# Patient Record
Sex: FEMALE | Race: WHITE | NOT HISPANIC OR LATINO | URBAN - METROPOLITAN AREA
[De-identification: names, ages, dates, MRNs, and addresses within clinical notes are randomized per-mention and may not be internally consistent; named-entity substitution may affect disease eponyms.]

---

## 2017-06-17 ENCOUNTER — INPATIENT (INPATIENT)
Facility: HOSPITAL | Age: 82
LOS: 3 days | Discharge: NURSING, REHAB OR LONG-TERM CARE | DRG: 871 | End: 2017-06-21
Attending: INTERNAL MEDICINE | Admitting: HOSPITALIST
Payer: MEDICARE

## 2017-06-17 VITALS
DIASTOLIC BLOOD PRESSURE: 70 MMHG | WEIGHT: 139.99 LBS | RESPIRATION RATE: 16 BRPM | HEIGHT: 64 IN | SYSTOLIC BLOOD PRESSURE: 142 MMHG | HEART RATE: 76 BPM

## 2017-06-17 DIAGNOSIS — N39.0 URINARY TRACT INFECTION, SITE NOT SPECIFIED: ICD-10-CM

## 2017-06-17 DIAGNOSIS — E11.9 TYPE 2 DIABETES MELLITUS WITHOUT COMPLICATIONS: ICD-10-CM

## 2017-06-17 DIAGNOSIS — E78.00 PURE HYPERCHOLESTEROLEMIA, UNSPECIFIED: ICD-10-CM

## 2017-06-17 DIAGNOSIS — A41.9 SEPSIS, UNSPECIFIED ORGANISM: ICD-10-CM

## 2017-06-17 DIAGNOSIS — R32 UNSPECIFIED URINARY INCONTINENCE: ICD-10-CM

## 2017-06-17 DIAGNOSIS — Z66 DO NOT RESUSCITATE: ICD-10-CM

## 2017-06-17 DIAGNOSIS — Z29.9 ENCOUNTER FOR PROPHYLACTIC MEASURES, UNSPECIFIED: ICD-10-CM

## 2017-06-17 DIAGNOSIS — Z86.73 PERSONAL HISTORY OF TRANSIENT ISCHEMIC ATTACK (TIA), AND CEREBRAL INFARCTION WITHOUT RESIDUAL DEFICITS: ICD-10-CM

## 2017-06-17 DIAGNOSIS — R41.82 ALTERED MENTAL STATUS, UNSPECIFIED: ICD-10-CM

## 2017-06-17 DIAGNOSIS — Z90.710 ACQUIRED ABSENCE OF BOTH CERVIX AND UTERUS: Chronic | ICD-10-CM

## 2017-06-17 LAB
ACETONE SERPL-MCNC: ABNORMAL
ALBUMIN SERPL ELPH-MCNC: 4.2 G/DL — SIGNIFICANT CHANGE UP (ref 3.3–5.2)
ALP SERPL-CCNC: 115 U/L — SIGNIFICANT CHANGE UP (ref 40–120)
ALT FLD-CCNC: 18 U/L — SIGNIFICANT CHANGE UP
ANION GAP SERPL CALC-SCNC: 20 MMOL/L — HIGH (ref 5–17)
APTT BLD: 25.8 SEC — LOW (ref 27.5–37.4)
AST SERPL-CCNC: 28 U/L — SIGNIFICANT CHANGE UP
BASOPHILS # BLD AUTO: 0 K/UL — SIGNIFICANT CHANGE UP (ref 0–0.2)
BASOPHILS NFR BLD AUTO: 0.3 % — SIGNIFICANT CHANGE UP (ref 0–2)
BILIRUB SERPL-MCNC: 0.7 MG/DL — SIGNIFICANT CHANGE UP (ref 0.4–2)
BLD GP AB SCN SERPL QL: SIGNIFICANT CHANGE UP
BUN SERPL-MCNC: 25 MG/DL — HIGH (ref 8–20)
CALCIUM SERPL-MCNC: 9.5 MG/DL — SIGNIFICANT CHANGE UP (ref 8.6–10.2)
CHLORIDE SERPL-SCNC: 96 MMOL/L — LOW (ref 98–107)
CO2 SERPL-SCNC: 20 MMOL/L — LOW (ref 22–29)
CREAT SERPL-MCNC: 1.19 MG/DL — SIGNIFICANT CHANGE UP (ref 0.5–1.3)
EOSINOPHIL # BLD AUTO: 0 K/UL — SIGNIFICANT CHANGE UP (ref 0–0.5)
EOSINOPHIL NFR BLD AUTO: 0.1 % — SIGNIFICANT CHANGE UP (ref 0–6)
GLUCOSE SERPL-MCNC: 268 MG/DL — HIGH (ref 70–115)
HCT VFR BLD CALC: 38.8 % — SIGNIFICANT CHANGE UP (ref 37–47)
HGB BLD-MCNC: 13 G/DL — SIGNIFICANT CHANGE UP (ref 12–16)
INR BLD: 1.11 RATIO — SIGNIFICANT CHANGE UP (ref 0.88–1.16)
LACTATE BLDV-MCNC: 1.2 MMOL/L — SIGNIFICANT CHANGE UP (ref 0.5–2)
LYMPHOCYTES # BLD AUTO: 1.6 K/UL — SIGNIFICANT CHANGE UP (ref 1–4.8)
LYMPHOCYTES # BLD AUTO: 10.4 % — LOW (ref 20–55)
MCHC RBC-ENTMCNC: 28.8 PG — SIGNIFICANT CHANGE UP (ref 27–31)
MCHC RBC-ENTMCNC: 33.5 G/DL — SIGNIFICANT CHANGE UP (ref 32–36)
MCV RBC AUTO: 86 FL — SIGNIFICANT CHANGE UP (ref 81–99)
MONOCYTES # BLD AUTO: 1.2 K/UL — HIGH (ref 0–0.8)
MONOCYTES NFR BLD AUTO: 7.7 % — SIGNIFICANT CHANGE UP (ref 3–10)
NEUTROPHILS # BLD AUTO: 12.7 K/UL — HIGH (ref 1.8–8)
NEUTROPHILS NFR BLD AUTO: 81.2 % — HIGH (ref 37–73)
PLATELET # BLD AUTO: 221 K/UL — SIGNIFICANT CHANGE UP (ref 150–400)
POTASSIUM SERPL-MCNC: 4.7 MMOL/L — SIGNIFICANT CHANGE UP (ref 3.5–5.3)
POTASSIUM SERPL-SCNC: 4.7 MMOL/L — SIGNIFICANT CHANGE UP (ref 3.5–5.3)
PROT SERPL-MCNC: 7.5 G/DL — SIGNIFICANT CHANGE UP (ref 6.6–8.7)
PROTHROM AB SERPL-ACNC: 12.2 SEC — SIGNIFICANT CHANGE UP (ref 9.8–12.7)
RBC # BLD: 4.51 M/UL — SIGNIFICANT CHANGE UP (ref 4.4–5.2)
RBC # FLD: 13.4 % — SIGNIFICANT CHANGE UP (ref 11–15.6)
SODIUM SERPL-SCNC: 136 MMOL/L — SIGNIFICANT CHANGE UP (ref 135–145)
TROPONIN T SERPL-MCNC: <0.01 NG/ML — SIGNIFICANT CHANGE UP (ref 0–0.06)
TYPE + AB SCN PNL BLD: SIGNIFICANT CHANGE UP
WBC # BLD: 15.6 K/UL — HIGH (ref 4.8–10.8)
WBC # FLD AUTO: 15.6 K/UL — HIGH (ref 4.8–10.8)

## 2017-06-17 PROCEDURE — 99291 CRITICAL CARE FIRST HOUR: CPT

## 2017-06-17 PROCEDURE — 70450 CT HEAD/BRAIN W/O DYE: CPT | Mod: 26

## 2017-06-17 PROCEDURE — 71010: CPT | Mod: 26

## 2017-06-17 PROCEDURE — 99221 1ST HOSP IP/OBS SF/LOW 40: CPT

## 2017-06-17 PROCEDURE — 93010 ELECTROCARDIOGRAM REPORT: CPT

## 2017-06-17 PROCEDURE — 99223 1ST HOSP IP/OBS HIGH 75: CPT | Mod: GC

## 2017-06-17 RX ORDER — PIPERACILLIN AND TAZOBACTAM 4; .5 G/20ML; G/20ML
3.38 INJECTION, POWDER, LYOPHILIZED, FOR SOLUTION INTRAVENOUS ONCE
Qty: 0 | Refills: 0 | Status: COMPLETED | OUTPATIENT
Start: 2017-06-17 | End: 2017-06-17

## 2017-06-17 RX ORDER — CEFTRIAXONE 500 MG/1
1 INJECTION, POWDER, FOR SOLUTION INTRAMUSCULAR; INTRAVENOUS EVERY 24 HOURS
Qty: 0 | Refills: 0 | Status: DISCONTINUED | OUTPATIENT
Start: 2017-06-18 | End: 2017-06-21

## 2017-06-17 RX ORDER — DEXTROSE 50 % IN WATER 50 %
1 SYRINGE (ML) INTRAVENOUS ONCE
Qty: 0 | Refills: 0 | Status: DISCONTINUED | OUTPATIENT
Start: 2017-06-17 | End: 2017-06-21

## 2017-06-17 RX ORDER — ENOXAPARIN SODIUM 100 MG/ML
40 INJECTION SUBCUTANEOUS EVERY 24 HOURS
Qty: 0 | Refills: 0 | Status: DISCONTINUED | OUTPATIENT
Start: 2017-06-17 | End: 2017-06-21

## 2017-06-17 RX ORDER — ATORVASTATIN CALCIUM 80 MG/1
20 TABLET, FILM COATED ORAL AT BEDTIME
Qty: 0 | Refills: 0 | Status: DISCONTINUED | OUTPATIENT
Start: 2017-06-17 | End: 2017-06-21

## 2017-06-17 RX ORDER — SODIUM CHLORIDE 9 MG/ML
1000 INJECTION, SOLUTION INTRAVENOUS
Qty: 0 | Refills: 0 | Status: DISCONTINUED | OUTPATIENT
Start: 2017-06-17 | End: 2017-06-21

## 2017-06-17 RX ORDER — GLUCAGON INJECTION, SOLUTION 0.5 MG/.1ML
1 INJECTION, SOLUTION SUBCUTANEOUS ONCE
Qty: 0 | Refills: 0 | Status: DISCONTINUED | OUTPATIENT
Start: 2017-06-17 | End: 2017-06-21

## 2017-06-17 RX ORDER — DEXTROSE 50 % IN WATER 50 %
12.5 SYRINGE (ML) INTRAVENOUS ONCE
Qty: 0 | Refills: 0 | Status: DISCONTINUED | OUTPATIENT
Start: 2017-06-17 | End: 2017-06-21

## 2017-06-17 RX ORDER — SODIUM CHLORIDE 9 MG/ML
2000 INJECTION INTRAMUSCULAR; INTRAVENOUS; SUBCUTANEOUS ONCE
Qty: 0 | Refills: 0 | Status: COMPLETED | OUTPATIENT
Start: 2017-06-17 | End: 2017-06-17

## 2017-06-17 RX ORDER — DEXTROSE 50 % IN WATER 50 %
25 SYRINGE (ML) INTRAVENOUS ONCE
Qty: 0 | Refills: 0 | Status: DISCONTINUED | OUTPATIENT
Start: 2017-06-17 | End: 2017-06-21

## 2017-06-17 RX ORDER — SERTRALINE 25 MG/1
100 TABLET, FILM COATED ORAL DAILY
Qty: 0 | Refills: 0 | Status: DISCONTINUED | OUTPATIENT
Start: 2017-06-17 | End: 2017-06-18

## 2017-06-17 RX ORDER — INSULIN LISPRO 100/ML
VIAL (ML) SUBCUTANEOUS AT BEDTIME
Qty: 0 | Refills: 0 | Status: DISCONTINUED | OUTPATIENT
Start: 2017-06-17 | End: 2017-06-21

## 2017-06-17 RX ORDER — SODIUM CHLORIDE 9 MG/ML
1000 INJECTION, SOLUTION INTRAVENOUS
Qty: 0 | Refills: 0 | Status: DISCONTINUED | OUTPATIENT
Start: 2017-06-17 | End: 2017-06-18

## 2017-06-17 RX ORDER — VANCOMYCIN HCL 1 G
1000 VIAL (EA) INTRAVENOUS ONCE
Qty: 0 | Refills: 0 | Status: COMPLETED | OUTPATIENT
Start: 2017-06-17 | End: 2017-06-17

## 2017-06-17 RX ORDER — ASPIRIN AND DIPYRIDAMOLE 25; 200 MG/1; MG/1
1 CAPSULE, EXTENDED RELEASE ORAL
Qty: 0 | Refills: 0 | Status: DISCONTINUED | OUTPATIENT
Start: 2017-06-17 | End: 2017-06-21

## 2017-06-17 RX ORDER — ACETAMINOPHEN 500 MG
650 TABLET ORAL ONCE
Qty: 0 | Refills: 0 | Status: COMPLETED | OUTPATIENT
Start: 2017-06-17 | End: 2017-06-17

## 2017-06-17 RX ORDER — INSULIN LISPRO 100/ML
VIAL (ML) SUBCUTANEOUS
Qty: 0 | Refills: 0 | Status: DISCONTINUED | OUTPATIENT
Start: 2017-06-17 | End: 2017-06-21

## 2017-06-17 RX ADMIN — CEFTRIAXONE 100 GRAM(S): 500 INJECTION, POWDER, FOR SOLUTION INTRAMUSCULAR; INTRAVENOUS at 23:45

## 2017-06-17 RX ADMIN — PIPERACILLIN AND TAZOBACTAM 200 GRAM(S): 4; .5 INJECTION, POWDER, LYOPHILIZED, FOR SOLUTION INTRAVENOUS at 18:25

## 2017-06-17 RX ADMIN — SODIUM CHLORIDE 100 MILLILITER(S): 9 INJECTION, SOLUTION INTRAVENOUS at 23:45

## 2017-06-17 RX ADMIN — ATORVASTATIN CALCIUM 20 MILLIGRAM(S): 80 TABLET, FILM COATED ORAL at 23:44

## 2017-06-17 RX ADMIN — SODIUM CHLORIDE 2000 MILLILITER(S): 9 INJECTION INTRAMUSCULAR; INTRAVENOUS; SUBCUTANEOUS at 18:08

## 2017-06-17 RX ADMIN — Medication 650 MILLIGRAM(S): at 18:22

## 2017-06-17 RX ADMIN — Medication 250 MILLIGRAM(S): at 18:59

## 2017-06-17 NOTE — H&P ADULT - PROBLEM SELECTOR PLAN 1
Initial Stroke work up in ER, patient has CT scan without any acute hemorrhage or obvious infarct. Changes correlate to previous stroke. Likely secondary to UTI, patient appears to be back at baseline mentation and strength after ABx and IV hydration.   Admit to Dr Yoon  Any Bed  Bedside swallow evaluation passed; Diabetic diet  Activity: ambulate with walker and assistance  S/p Zosyn 1 dose  Change to rocephin 1g IV [id number]  Mild leukocytosis to 15  Lactate 1.2  BUN/Cr show dehydration, s/p 2L NS bolus  LR at 100cc/hr. Will monitor and decrease in AM as needed  Ua and UCx  TSH  RPR  Folate level  B12 level Initial Stroke work up in ER, patient has CT scan without any acute hemorrhage or obvious infarct. Changes correlate to previous stroke. Unlikely stroke, as more likely secondary to acute delerium secondary to acute cystitis. Patient appears to be back at baseline mentation and strength after ABx and IV hydration.   Admit to Dr Yoon  Any Bed  Bedside swallow evaluation passed; Diabetic diet  Activity: ambulate with walker and assistance  S/p Zosyn 1 dose  Change to rocephin 1g IV [id number]  Mild leukocytosis to 15  Lactate 1.2  BUN/Cr show dehydration, s/p 2L NS bolus  LR at 100cc/hr. Will monitor and decrease in AM as needed  Ua and UCx  TSH  RPR  Folate level  B12 level

## 2017-06-17 NOTE — ED ADULT TRIAGE NOTE - CHIEF COMPLAINT QUOTE
BIBA, patient is awake, sent to the Ed for eval of CVA, last known well 4pm, no family present upon arrival

## 2017-06-17 NOTE — ED PROVIDER NOTE - CARE PLAN
Principal Discharge DX:	Sepsis  Secondary Diagnosis:	Diabetes  Secondary Diagnosis:	High cholesterol

## 2017-06-17 NOTE — ED PROVIDER NOTE - CRITICAL CARE PROVIDED
direct patient care (not related to procedure)/additional history taking/consultation with other physicians/documentation/interpretation of diagnostic studies

## 2017-06-17 NOTE — H&P ADULT - NSHPPHYSICALEXAM_GEN_ALL_CORE
Vital Signs Last 24 Hrs  T(C): 37.6, Max: 38.7 (06-17 @ 17:49)  T(F): 99.7, Max: 101.7 (06-17 @ 17:49)  HR: 97 (76 - 105)  BP: 139/61 (139/61 - 162/64)  BP(mean): --  RR: 19 (16 - 19)  SpO2: 97% (96% - 97%) Vital Signs Last 24 Hrs  T(C): 37.6, Max: 38.7 (06-17 @ 17:49)  T(F): 99.7, Max: 101.7 (06-17 @ 17:49)  HR: 97 (76 - 105)  BP: 139/61 (139/61 - 162/64)  RR: 19 (16 - 19)  SpO2: 97% (96% - 97%)    Gen: NAD  HEENT: NCAT, PERRLA, Pupils 2mm bilaterally  Neck: supple, no lymphadenopathy  CVS: RRR, +s1/s2, no murmurs appreciated. Cap Refill <2 sec  Lungs: Good air entry bilaterally, CTAB, no wheeze/rales/rhonchi  Abd: +BS, soft, NT, ND, no guarding, rebound tenderness or rigidity  Ext: No cyanosis, edema or calf tenderness, +DP/PT pulses bilaterally  Neuro: Awake, Alert and Oriented to person and place however not to time. CNII-XII grossly intact, motor strength 5/5 in upper right extremity and 3/5 in left upper extremity and 5/5 in lower right extremity and 2/5 in lower left extremity, sensation intact in bilateral upper and lower extremities. Unable to assess Gait. Vital Signs Last 24 Hrs  T(C): 37.6, Max: 38.7 (06-17 @ 17:49)  T(F): 99.7, Max: 101.7 (06-17 @ 17:49)  HR: 97 (76 - 105)  BP: 139/61 (139/61 - 162/64)  RR: 19 (16 - 19)  SpO2: 97% (96% - 97%)    Gen: NAD  HEENT: NCAT, PERRLA, Pupils 2mm bilaterally  Neck: supple, no lymphadenopathy  CVS: RRR, +s1/s2, no murmurs appreciated. Cap Refill <2 sec  Lungs: Good air entry bilaterally, CTAB, no wheeze/rales/rhonchi  Abd: +BS, soft, NT, ND, no guarding, rebound tenderness or rigidity  Ext: No cyanosis, edema or calf tenderness, +DP/PT pulses bilaterally  Neuro: Awake, Alert and Oriented to person and place however not to time. CNII-XII grossly intact, motor strength 5/5 in upper right extremity and 3/5 in left upper extremity and 5/5 in lower right extremity and 2/5 in lower left extremity, sensation intact in bilateral upper and lower extremities. Unable to assess Gait.  Skin: No ulcers, rashes or skin breaks.

## 2017-06-17 NOTE — H&P ADULT - HISTORY OF PRESENT ILLNESS
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 Patient was woken up by her son today at 3:30pm, she appeared to be groggy which was not unusual for her, he left to prepare breakfast for her and upon his return she was increasingly weak and lethargic. She had no shaking or seizure like activity, was not confused or combative. Denies any fever or chills, however as per son he has noticed increased urinary frequency. Denies any cough, aspiration, sick contacts or change in any medication.   Patient lives at home with family, ambulates with walker despite left sided residual weakness, able to perform most ADLs excluding cooking.

## 2017-06-17 NOTE — ED PROVIDER NOTE - OBJECTIVE STATEMENT
The patient is a 82 year old female with history of 4 prior CVAs and DM woke up at 3:30 PM today with AMS, generalized weakness and slurred speech found by her son.  Ian Mchugh Called. No TPA candidate as she woke up these symptoms.    In ER, the patient is febrile and tachycardic, IVF and ABX and Pan-culture ordered

## 2017-06-17 NOTE — ED ADULT NURSE REASSESSMENT NOTE - NS ED NURSE REASSESS COMMENT FT1
patient in no distress VSS, temp down to 99.6, alert awake answering questions appropriatly, respirations even and unlabored remains on CM

## 2017-06-17 NOTE — H&P ADULT - PROBLEM SELECTOR PLAN 3
CT head without any acute findings  Will not require stroke monitoring or MRI  Aggrenox 25/200mg BID  Lipitor 20mg PO qhs

## 2017-06-17 NOTE — H&P ADULT - PROBLEM SELECTOR PLAN 7
IMPROVE score risk 1.7%  HASBLED score 1.1%  VTE prophylaxis: Lovenox 40mg SQ qdaily Discussed with son and confirmed that patient is DNR/DNI  Son is health care proxy  Patient has living will stating DNR/DNI status  Will complete MOLST form

## 2017-06-17 NOTE — ED ADULT NURSE NOTE - OBJECTIVE STATEMENT
pt alert and awake, + AMS, as per son, pt went to bed, woke up at 1530 with stroke like symptoms and progressively worse around 1600, as per son, unknown exact time of symptoms, as per son, pt acting right, pt has hx of stroke with left sided deficits, son states unable to move left side, NIH of 18, pt also presents with hematoma to left side of forehead, with ecchymotic areas to left knee and old healed wound on right thigh, LS diminished at bases, will continue to monitor.

## 2017-06-17 NOTE — ED PROVIDER NOTE - ENMT, MLM
Airway patent, Nasal mucosa clear. Mouth with dry oral mucosa. Throat has no vesicles, no oropharyngeal exudates and uvula is midline.

## 2017-06-17 NOTE — H&P ADULT - PROBLEM SELECTOR PLAN 6
Patient currently being treated for UTI,   Will hold off on Oxybutynin during acute phase of treatment

## 2017-06-17 NOTE — H&P ADULT - NSHPLABSRESULTS_GEN_ALL_CORE
13.0   15.6  )-----------( 221      ( 17 Jun 2017 17:19 )             38.8       17 Jun 2017 17:19    136    |  96     |  25.0   ----------------------------<  268    4.7     |  20.0   |  1.19     Ca    9.5        17 Jun 2017 17:19    TPro  7.5    /  Alb  4.2    /  TBili  0.7    /  DBili  x      /  AST  28     /  ALT  18     /  AlkPhos  115    17 Jun 2017 17:19    INR - 1.11  Acetone - trace  Lactate 1.2    Radiology:  EXAM:  CT BRAIN STROKE PROTOCOL                        PROCEDURE DATE:  06/17/2017    INTERPRETATION:  Head CT without contrast   COMPARISON: None.  CLINICAL INFORMATION:  CVA symptoms. Acute CVA. Stroke protocol..  TECHNIQUE: Contiguous axial 2.5 mm slice thickness images of the head   were obtained without the use of intravenous contrast media.  FINDINGS:  Moderate cerebral volume loss is present with secondary proportional   prominence of the sulci and ventricles.  The posterior fossa structures and basal cisterns appear normal.  There is no intracranial hemorrhage.   There is no intracranial mass or midline shift.  There is no sulcal effacement to suggest acute stroke.  There are scattered white matter hypodensities consistent with small vessel disease.  Bilateral basal ganglia chronic lacunar infarcts noted.  The visualized portions of the optic globes and paranasal sinuses are normal.  There are no skull fractures.  IMPRESSION:  No acute intracranial findings.  Moderate atrophy and chronic white matter microvascular ischemic changes   as described.   If acute stroke is of clinical concern, MRI with diffusion-weighted   images would be helpful for further characterization.

## 2017-06-17 NOTE — H&P ADULT - ASSESSMENT
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 Concerning for UTI.

## 2017-06-17 NOTE — H&P ADULT - PROBLEM SELECTOR PLAN 2
s/p Zosyn IV x 1 dose  CW Rocephin 1g IV q24h  UCx and Ua Acute cystitis as mentioned above  s/p Zosyn IV x 1 dose  CW Rocephin 1g IV q24h  UCx and Ua  CT abdomen to rule out pyelonephritis

## 2017-06-17 NOTE — H&P ADULT - NSHPSOCIALHISTORY_GEN_ALL_CORE
Lives at home with Family  Ambulates with walker  Able to perform most ADLs except cooking  Previous smoker smoked 2-3 packs per day for 30 years quit 25 years ago  No ETOH or drugs.

## 2017-06-17 NOTE — ED PROVIDER NOTE - MEDICAL DECISION MAKING DETAILS
The patient woke up with generalized weakness and slurred speech. In ED febrile and tachycardic.  Will admit for further evaluation

## 2017-06-17 NOTE — H&P ADULT - NSHPREVIEWOFSYSTEMS_GEN_ALL_CORE
General:	NAD, no confusion  Skin/Breast: +SCC on left side of forehead that was recently removed	  Ophthalmologic: No changes in vision	  ENMT: No difficulty swallowing, swelling in throat  Respiratory and Thorax: No wheezing, cough or SOB	  Cardiovascular: No chest pain, SOB, palpitations or pedal edema  Gastrointestinal: No constipation/diarrhea  Genitourinary: +increased urinary frequency, no dysuria  Musculoskeletal: +left sided residual weakness in upper ext and lower extremity.  Neurological: Focal deficits in left upper and lower ext, mentation intact  Psychiatric: No sadness, agustín or hysteria  Hematology/Lymphatics: No bleeding or swelling  Endocrine: No heat/cold intolerance

## 2017-06-17 NOTE — H&P ADULT - PROBLEM SELECTOR PLAN 4
Accuchecks  Diabetic consistent carb diet  Humalog sliding scale  Observe FS and adjust insulin requirement as needed

## 2017-06-18 DIAGNOSIS — G93.40 ENCEPHALOPATHY, UNSPECIFIED: ICD-10-CM

## 2017-06-18 LAB
ACETONE SERPL-MCNC: ABNORMAL
ANION GAP SERPL CALC-SCNC: 18 MMOL/L — HIGH (ref 5–17)
ANION GAP SERPL CALC-SCNC: 19 MMOL/L — HIGH (ref 5–17)
APPEARANCE UR: ABNORMAL
BACTERIA # UR AUTO: ABNORMAL
BASOPHILS # BLD AUTO: 0 K/UL — SIGNIFICANT CHANGE UP (ref 0–0.2)
BASOPHILS NFR BLD AUTO: 0.2 % — SIGNIFICANT CHANGE UP (ref 0–2)
BILIRUB UR-MCNC: NEGATIVE — SIGNIFICANT CHANGE UP
BUN SERPL-MCNC: 18 MG/DL — SIGNIFICANT CHANGE UP (ref 8–20)
BUN SERPL-MCNC: 18 MG/DL — SIGNIFICANT CHANGE UP (ref 8–20)
CALCIUM SERPL-MCNC: 8.9 MG/DL — SIGNIFICANT CHANGE UP (ref 8.6–10.2)
CALCIUM SERPL-MCNC: 9 MG/DL — SIGNIFICANT CHANGE UP (ref 8.6–10.2)
CHLORIDE SERPL-SCNC: 97 MMOL/L — LOW (ref 98–107)
CHLORIDE SERPL-SCNC: 98 MMOL/L — SIGNIFICANT CHANGE UP (ref 98–107)
CO2 SERPL-SCNC: 19 MMOL/L — LOW (ref 22–29)
CO2 SERPL-SCNC: 21 MMOL/L — LOW (ref 22–29)
COLOR SPEC: YELLOW — SIGNIFICANT CHANGE UP
COMMENT - URINE: SIGNIFICANT CHANGE UP
CREAT SERPL-MCNC: 0.98 MG/DL — SIGNIFICANT CHANGE UP (ref 0.5–1.3)
CREAT SERPL-MCNC: 1.1 MG/DL — SIGNIFICANT CHANGE UP (ref 0.5–1.3)
DIFF PNL FLD: ABNORMAL
EOSINOPHIL # BLD AUTO: 0 K/UL — SIGNIFICANT CHANGE UP (ref 0–0.5)
EOSINOPHIL NFR BLD AUTO: 0.1 % — SIGNIFICANT CHANGE UP (ref 0–6)
EPI CELLS # UR: SIGNIFICANT CHANGE UP
FOLATE SERPL-MCNC: >20 NG/ML — HIGH (ref 4–16)
GLUCOSE SERPL-MCNC: 105 MG/DL — SIGNIFICANT CHANGE UP (ref 70–115)
GLUCOSE SERPL-MCNC: 204 MG/DL — HIGH (ref 70–115)
GLUCOSE UR QL: 50 MG/DL
HBA1C BLD-MCNC: 7 % — HIGH (ref 4–5.6)
HCT VFR BLD CALC: 32.7 % — LOW (ref 37–47)
HGB BLD-MCNC: 10.8 G/DL — LOW (ref 12–16)
KETONES UR-MCNC: ABNORMAL
LEUKOCYTE ESTERASE UR-ACNC: ABNORMAL
LYMPHOCYTES # BLD AUTO: 1.6 K/UL — SIGNIFICANT CHANGE UP (ref 1–4.8)
LYMPHOCYTES # BLD AUTO: 11.1 % — LOW (ref 20–55)
MAGNESIUM SERPL-MCNC: 1.2 MG/DL — LOW (ref 1.6–2.6)
MAGNESIUM SERPL-MCNC: 2.4 MG/DL — SIGNIFICANT CHANGE UP (ref 1.6–2.6)
MCHC RBC-ENTMCNC: 28.5 PG — SIGNIFICANT CHANGE UP (ref 27–31)
MCHC RBC-ENTMCNC: 33 G/DL — SIGNIFICANT CHANGE UP (ref 32–36)
MCV RBC AUTO: 86.3 FL — SIGNIFICANT CHANGE UP (ref 81–99)
MONOCYTES # BLD AUTO: 1.5 K/UL — HIGH (ref 0–0.8)
MONOCYTES NFR BLD AUTO: 9.8 % — SIGNIFICANT CHANGE UP (ref 3–10)
NEUTROPHILS # BLD AUTO: 11.7 K/UL — HIGH (ref 1.8–8)
NEUTROPHILS NFR BLD AUTO: 78.5 % — HIGH (ref 37–73)
NITRITE UR-MCNC: NEGATIVE — SIGNIFICANT CHANGE UP
PH UR: 5 — SIGNIFICANT CHANGE UP (ref 5–8)
PHOSPHATE SERPL-MCNC: 2.2 MG/DL — LOW (ref 2.4–4.7)
PHOSPHATE SERPL-MCNC: 2.3 MG/DL — LOW (ref 2.4–4.7)
PLATELET # BLD AUTO: 197 K/UL — SIGNIFICANT CHANGE UP (ref 150–400)
POTASSIUM SERPL-MCNC: 3.8 MMOL/L — SIGNIFICANT CHANGE UP (ref 3.5–5.3)
POTASSIUM SERPL-MCNC: 4 MMOL/L — SIGNIFICANT CHANGE UP (ref 3.5–5.3)
POTASSIUM SERPL-SCNC: 3.8 MMOL/L — SIGNIFICANT CHANGE UP (ref 3.5–5.3)
POTASSIUM SERPL-SCNC: 4 MMOL/L — SIGNIFICANT CHANGE UP (ref 3.5–5.3)
PROT UR-MCNC: 15 MG/DL
RBC # BLD: 3.79 M/UL — LOW (ref 4.4–5.2)
RBC # FLD: 13.4 % — SIGNIFICANT CHANGE UP (ref 11–15.6)
RBC CASTS # UR COMP ASSIST: ABNORMAL /HPF (ref 0–4)
RPR SERPL-ACNC: SIGNIFICANT CHANGE UP
SODIUM SERPL-SCNC: 136 MMOL/L — SIGNIFICANT CHANGE UP (ref 135–145)
SODIUM SERPL-SCNC: 136 MMOL/L — SIGNIFICANT CHANGE UP (ref 135–145)
SP GR SPEC: 1 — LOW (ref 1.01–1.02)
T3 SERPL-MCNC: 55 NG/DL — LOW (ref 80–200)
T4 AB SER-ACNC: 6.4 UG/DL — SIGNIFICANT CHANGE UP (ref 4.5–12)
TSH SERPL-MCNC: 0.76 UIU/ML — SIGNIFICANT CHANGE UP (ref 0.27–4.2)
UROBILINOGEN FLD QL: NEGATIVE MG/DL — SIGNIFICANT CHANGE UP
VIT B12 SERPL-MCNC: 499 PG/ML — SIGNIFICANT CHANGE UP (ref 180–914)
WBC # BLD: 14.9 K/UL — HIGH (ref 4.8–10.8)
WBC # FLD AUTO: 14.9 K/UL — HIGH (ref 4.8–10.8)
WBC UR QL: ABNORMAL

## 2017-06-18 PROCEDURE — 93880 EXTRACRANIAL BILAT STUDY: CPT | Mod: 26

## 2017-06-18 PROCEDURE — 70450 CT HEAD/BRAIN W/O DYE: CPT | Mod: 26

## 2017-06-18 PROCEDURE — 74176 CT ABD & PELVIS W/O CONTRAST: CPT | Mod: 26

## 2017-06-18 PROCEDURE — 12345: CPT | Mod: GC,NC

## 2017-06-18 RX ORDER — OXYBUTYNIN CHLORIDE 5 MG
5 TABLET ORAL DAILY
Qty: 0 | Refills: 0 | Status: DISCONTINUED | OUTPATIENT
Start: 2017-06-19 | End: 2017-06-21

## 2017-06-18 RX ORDER — MAGNESIUM SULFATE 500 MG/ML
2 VIAL (ML) INJECTION
Qty: 0 | Refills: 0 | Status: DISCONTINUED | OUTPATIENT
Start: 2017-06-18 | End: 2017-06-18

## 2017-06-18 RX ORDER — SODIUM CHLORIDE 9 MG/ML
1000 INJECTION, SOLUTION INTRAVENOUS
Qty: 0 | Refills: 0 | Status: DISCONTINUED | OUTPATIENT
Start: 2017-06-18 | End: 2017-06-19

## 2017-06-18 RX ORDER — MAGNESIUM OXIDE 400 MG ORAL TABLET 241.3 MG
400 TABLET ORAL
Qty: 0 | Refills: 0 | Status: DISCONTINUED | OUTPATIENT
Start: 2017-06-18 | End: 2017-06-18

## 2017-06-18 RX ORDER — ACETAMINOPHEN 500 MG
650 TABLET ORAL EVERY 6 HOURS
Qty: 0 | Refills: 0 | Status: DISCONTINUED | OUTPATIENT
Start: 2017-06-18 | End: 2017-06-21

## 2017-06-18 RX ORDER — SODIUM,POTASSIUM PHOSPHATES 278-250MG
1 POWDER IN PACKET (EA) ORAL
Qty: 0 | Refills: 0 | Status: COMPLETED | OUTPATIENT
Start: 2017-06-18 | End: 2017-06-19

## 2017-06-18 RX ORDER — THIAMINE MONONITRATE (VIT B1) 100 MG
100 TABLET ORAL DAILY
Qty: 0 | Refills: 0 | Status: DISCONTINUED | OUTPATIENT
Start: 2017-06-18 | End: 2017-06-21

## 2017-06-18 RX ORDER — ASCORBIC ACID 60 MG
500 TABLET,CHEWABLE ORAL DAILY
Qty: 0 | Refills: 0 | Status: DISCONTINUED | OUTPATIENT
Start: 2017-06-18 | End: 2017-06-21

## 2017-06-18 RX ORDER — INSULIN GLARGINE 100 [IU]/ML
5 INJECTION, SOLUTION SUBCUTANEOUS AT BEDTIME
Qty: 0 | Refills: 0 | Status: DISCONTINUED | OUTPATIENT
Start: 2017-06-18 | End: 2017-06-21

## 2017-06-18 RX ORDER — MAGNESIUM OXIDE 400 MG ORAL TABLET 241.3 MG
400 TABLET ORAL DAILY
Qty: 0 | Refills: 0 | Status: DISCONTINUED | OUTPATIENT
Start: 2017-06-18 | End: 2017-06-21

## 2017-06-18 RX ORDER — SERTRALINE 25 MG/1
100 TABLET, FILM COATED ORAL DAILY
Qty: 0 | Refills: 0 | Status: DISCONTINUED | OUTPATIENT
Start: 2017-06-18 | End: 2017-06-21

## 2017-06-18 RX ADMIN — Medication 1: at 11:57

## 2017-06-18 RX ADMIN — Medication 1: at 22:19

## 2017-06-18 RX ADMIN — SODIUM CHLORIDE 75 MILLILITER(S): 9 INJECTION, SOLUTION INTRAVENOUS at 22:20

## 2017-06-18 RX ADMIN — Medication: at 17:36

## 2017-06-18 RX ADMIN — Medication 1 TABLET(S): at 22:18

## 2017-06-18 RX ADMIN — Medication 650 MILLIGRAM(S): at 09:17

## 2017-06-18 RX ADMIN — SERTRALINE 100 MILLIGRAM(S): 25 TABLET, FILM COATED ORAL at 18:23

## 2017-06-18 RX ADMIN — Medication: at 08:28

## 2017-06-18 RX ADMIN — Medication 50 GRAM(S): at 15:35

## 2017-06-18 RX ADMIN — ASPIRIN AND DIPYRIDAMOLE 1 CAPSULE(S): 25; 200 CAPSULE, EXTENDED RELEASE ORAL at 18:35

## 2017-06-18 RX ADMIN — Medication 50 GRAM(S): at 14:15

## 2017-06-18 RX ADMIN — ASPIRIN AND DIPYRIDAMOLE 1 CAPSULE(S): 25; 200 CAPSULE, EXTENDED RELEASE ORAL at 11:47

## 2017-06-18 RX ADMIN — ENOXAPARIN SODIUM 40 MILLIGRAM(S): 100 INJECTION SUBCUTANEOUS at 22:20

## 2017-06-18 RX ADMIN — Medication 1 TABLET(S): at 18:23

## 2017-06-18 RX ADMIN — CEFTRIAXONE 100 GRAM(S): 500 INJECTION, POWDER, FOR SOLUTION INTRAMUSCULAR; INTRAVENOUS at 23:08

## 2017-06-18 RX ADMIN — ATORVASTATIN CALCIUM 20 MILLIGRAM(S): 80 TABLET, FILM COATED ORAL at 22:18

## 2017-06-18 RX ADMIN — Medication 100 MILLIGRAM(S): at 22:17

## 2017-06-18 RX ADMIN — INSULIN GLARGINE 5 UNIT(S): 100 INJECTION, SOLUTION SUBCUTANEOUS at 22:18

## 2017-06-18 NOTE — CONSULT NOTE ADULT - ASSESSMENT
1) Long standing RIGHT staghorn calculus  2) Right pyelonephritis with sepsis    Recommendations :    No urological intervention at this time.    Thank you.
82 year old woman with a history of strokes with altered mental status and anisocoria.

## 2017-06-18 NOTE — PROGRESS NOTE ADULT - PROBLEM SELECTOR PLAN 6
Patient currently being treated for UTI,   Will hold off on Oxybutynin during acute phase of treatment -c/w Oxybutynin xl 5mg po qd

## 2017-06-18 NOTE — CONSULT NOTE ADULT - SUBJECTIVE AND OBJECTIVE BOX
CHIEF COMPLAINT: altered mental status, anisocoria    HPI: 82yFemale admitted yesterday with altered mental status and generalized weakness. She has a history of two strokes (20 and 4 years ago) resulting in left sided weakness. She also has some early dementia. Yesterday her son found her to be less alert than usual. She was also very weak. She was brought to the ED and was found to have a UTI. After receiving IV antibiotics and IV fluids she seemed to return to her baseline. This morning she was found to be confused again. Her pupils were also found to be asymmetric with right pupil larger. Her son is at the bedside and is not sure if anisocoria is chronic. He does report chronic problems in the right eye due to diabetes.     REVIEW OF SYSTEMS: Pertinent symptoms negative other than listed in HPI and PMH.    PAST MEDICAL & SURGICAL HISTORY:  Urinary incontinence  Stroke: 20 years prior and 4 years prior  High cholesterol  Diabetes  H/O: hysterectomy    MEDICATIONS  (STANDING):  enoxaparin Injectable 40milliGRAM(s) SubCutaneous every 24 hours  cefTRIAXone   IVPB 1Gram(s) IV Intermittent every 24 hours  atorvastatin 20milliGRAM(s) Oral at bedtime  dipyridamole 200 mG/aspirin 25 mG 1Capsule(s) Oral two times a day  insulin lispro (HumaLOG) corrective regimen sliding scale  SubCutaneous three times a day before meals  insulin lispro (HumaLOG) corrective regimen sliding scale  SubCutaneous at bedtime  dextrose 5%. 1000milliLiter(s) IV Continuous <Continuous>  dextrose 50% Injectable 12.5Gram(s) IV Push once  dextrose 50% Injectable 25Gram(s) IV Push once  dextrose 50% Injectable 25Gram(s) IV Push once  lactated ringers. 1000milliLiter(s) IV Continuous <Continuous>    MEDICATIONS  (PRN):  dextrose Gel 1Dose(s) Oral once PRN Blood Glucose LESS THAN 70 milliGRAM(s)/deciliter  glucagon  Injectable 1milliGRAM(s) IntraMuscular once PRN Glucose LESS THAN 70 milligrams/deciliter  acetaminophen   Tablet 650milliGRAM(s) Oral every 6 hours PRN For Temp greater than 38 C (100.4 F)    Allergies    No Known Allergies    Intolerances        FAMILY HISTORY:  No pertinent family history in first degree relatives          SOCIAL HISTORY:    Living Situation:  lives with family      VITAL SIGNS:  Vital Signs Last 24 Hrs  T(C): 37.7, Max: 38.8 (06-18 @ 07:42)  T(F): 99.8, Max: 101.8 (06-18 @ 07:42)  HR: 102 (76 - 105)  BP: 139/69 (135/62 - 162/64)  BP(mean): --  RR: 18 (16 - 19)  SpO2: 97% (95% - 98%)    PHYSICAL EXAMINATION:  General: Well-developed, well nourished, in no acute distress.  Eyes: Conjunctiva and sclera clear.  Neurologic:  - Mental Status:  Alert, awake. Minimal verbal output. States that her name is Emilie. She is not oriented to place or time.   Not following commands.  Cranial Nerves II-XII:    II:  Visual fields: unreliable. Right pupil 4 mm and L pupil 2-3 mm. Both reactive   III, IV, VI:  Extraocular movements are intact without nystagmus.  V:  Facial sensation: unreliable  VII:  Face is symmetric with normal eye closure and smile  VIII:  Hearing: unreliable  IX, X:  Uvula is midline and soft palate rises symmetrically  XI:  Head turning and shoulder shrug are intact.  XII:  Patient refuses to stick out tongue  - Motor:  Patient not cooperative with confrontation testing. She does have some chronic left leg weakness.   - Reflexes:  3+ symmetric, plantars flexor bilaterally  - Sensory:  unreliable  - Gait: deferred    LABS:                          10.8   14.9  )-----------( 197      ( 18 Jun 2017 08:30 )             32.7     18 Jun 2017 08:30    136    |  97     |  18.0   ----------------------------<  204    4.0     |  21.0   |  1.10     Ca    8.9        18 Jun 2017 08:30  Phos  2.2       18 Jun 2017 08:30  Mg     1.2       18 Jun 2017 08:30    TPro  7.5    /  Alb  4.2    /  TBili  0.7    /  DBili  x      /  AST  28     /  ALT  18     /  AlkPhos  115    17 Jun 2017 17:19    LIVER FUNCTIONS - ( 17 Jun 2017 17:19 )  Alb: 4.2 g/dL / Pro: 7.5 g/dL / ALK PHOS: 115 U/L / ALT: 18 U/L / AST: 28 U/L / GGT: x           PT/INR - ( 17 Jun 2017 17:19 )   PT: 12.2 sec;   INR: 1.11 ratio         PTT - ( 17 Jun 2017 17:19 )  PTT:25.8 sec      RADIOLOGY & ADDITIONAL STUDIES:    CT brain 6/17/17;     No acute intracranial findings.    Moderate atrophy and chronic white matter microvascular ischemic changes   as described.
Hx from son, Gulshan Mongesins. Patient is a poor historian.    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 Patient was woken up by her son today at 3:30pm, she appeared to be groggy which was not unusual for her, he left to prepare breakfast for her and upon his return she was increasingly weak and lethargic. She had no shaking or seizure like activity, was not confused or combative. Denies any fever or chills, however as per son he has noticed increased urinary frequency. Denies any cough, aspiration, sick contacts or change in any medication.   Patient lives at home with family, ambulates with walker despite left sided residual weakness, able to perform most ADLs excluding cooking. (2017 21:29)  Patient was found to be septic & W/U demonstrated a right staghorn calculus. She denies RT flank & abd pain, dysuria, increased frequency or urgency, fever, chills & gross hematuria.  She has not had frequent UTI's.  Urology consultation was requested for recommendations.    Past  Hx :  According to her son, she has had this staghorn for a long time.  She had ESWL of RT renal stones about 20 yrs ago. No other  procedures.  Urinary incontinence-on oxybutynin from PCP    PAST MEDICAL & SURGICAL HISTORY:  Urinary incontinence  Stroke: 20 years prior and 4 years prior  High cholesterol  Diabetes  H/O: hysterectomy      REVIEW OF SYSTEMS:    Constitutional: No fever, weight loss or fatigue  Eyes: No eye pain, visual disturbances, or discharge  ENMT:  No difficulty hearing, tinnitus, vertigo; No sinus or throat pain  Respiratory: No cough, wheezing, chills or hemoptysis  Cardiovascular: No chest pain, palpitations, shortness of breath, dizziness or leg swelling  Gastrointestinal: No abdominal or epigastric pain. No nausea, vomiting or hematemesis; No diarrhea or constipation. No melena or hematochezia.  Genitourinary: No dysuria, frequency, hematuria or incontinence  Rectal: No pain, hemorrhoids or incontinence  Neurological: No headaches, memory loss, loss of strength, numbness or tremors  Skin: No itching, burning, rashes or lesions   Lymph Nodes: No enlarged glands  Musculoskeletal: No joint pain or swelling; No muscle, back or extremity pain  Psychiatric: No depression, anxiety, mood swings or difficulty sleeping  Heme/Lymph: No easy bruising or bleeding gums  Allergy and Immunologic: No hives or eczema    MEDICATIONS  (STANDING):  enoxaparin Injectable 40milliGRAM(s) SubCutaneous every 24 hours  cefTRIAXone   IVPB 1Gram(s) IV Intermittent every 24 hours  atorvastatin 20milliGRAM(s) Oral at bedtime  dipyridamole 200 mG/aspirin 25 mG 1Capsule(s) Oral two times a day  insulin lispro (HumaLOG) corrective regimen sliding scale  SubCutaneous three times a day before meals  insulin lispro (HumaLOG) corrective regimen sliding scale  SubCutaneous at bedtime  dextrose 5%. 1000milliLiter(s) IV Continuous <Continuous>  dextrose 50% Injectable 12.5Gram(s) IV Push once  dextrose 50% Injectable 25Gram(s) IV Push once  dextrose 50% Injectable 25Gram(s) IV Push once  lactated ringers. 1000milliLiter(s) IV Continuous <Continuous>  magnesium oxide 400milliGRAM(s) Oral three times a day with meals  magnesium sulfate  IVPB 2Gram(s) IV Intermittent every 1 hour  potassium acid phosphate/sodium acid phosphate tablet (K-PHOS No. 2) 1Tablet(s) Oral four times a day with meals  multivitamin 1Tablet(s) Oral daily  sertraline 100milliGRAM(s) Oral daily    MEDICATIONS  (PRN):  dextrose Gel 1Dose(s) Oral once PRN Blood Glucose LESS THAN 70 milliGRAM(s)/deciliter  glucagon  Injectable 1milliGRAM(s) IntraMuscular once PRN Glucose LESS THAN 70 milligrams/deciliter  acetaminophen   Tablet 650milliGRAM(s) Oral every 6 hours PRN For Temp greater than 38 C (100.4 F)      Allergies    No Known Allergies    SOCIAL HISTORY:    Tobacco : quit 30 yrs ago; 1 PPD x 20 yrs  ETOH : none    Children : 1-adopted    FAMILY HISTORY:  No pertinent family history in first degree relatives      Vital Signs Last 24 Hrs  T(C): 37.2, Max: 38.8 (-18 @ 07:42)  T(F): 99, Max: 101.8 (18 @ 07:42)  HR: 90 (76 - 105)  BP: 106/66 (106/66 - 162/64)  BP(mean): --  RR: 17 (16 - 19)  SpO2: 97% (95% - 98%)    PHYSICAL EXAM:    General: Well developed; well nourished; in no acute distress  Head: Normocephalic; atraumatic  Respiratory: No wheezes, rales or rhonchi  Cardiovascular: Regular rate and rhythm. S1 and S2 Normal; No murmurs, gallops or rubs  Gastrointestinal: Soft non-tender non-distended; Normal bowel sounds; No hepatosplenomegaly  Genitourinary: No costovertebral angle tenderness.  Urinary bladder is clinically not distended  Extremities: No clubbing, cyanosis or edema  Vascular: femoral pulses palpable 1+ bilaterally  Neurological: Alert and awake  Skin: Warm and dry. No acute rash  Musculoskeletal: Normal tone, without deformities  Psychiatric: Cooperative and appropriate      LABS:                        10.8   14.9  )-----------( 197      ( 2017 08:30 )             32.7     06-18    136  |  97<L>  |  18.0  ----------------------------<  204<H>  4.0   |  21.0<L>  |  1.10    Ca    8.9      2017 08:30  Phos  2.2     06-18  Mg     1.2     -18    TPro  7.5  /  Alb  4.2  /  TBili  0.7  /  DBili  x   /  AST  28  /  ALT  18  /  AlkPhos  115  06-17    PT/INR - ( 2017 17:19 )   PT: 12.2 sec;   INR: 1.11 ratio         PTT - ( 2017 17:19 )  PTT:25.8 sec  Urinalysis Basic - ( 2017 02:52 )    Color: Yellow / Appearance: Slightly Turbid / S.005 / pH: x  Gluc: x / Ketone: Trace  / Bili: Negative / Urobili: Negative mg/dL   Blood: x / Protein: 15 mg/dL / Nitrite: Negative   Leuk Esterase: Moderate / RBC: 3-5 /HPF / WBC 6-10   Sq Epi: x / Non Sq Epi: Occasional / Bacteria: Occasional            RADIOLOGY & ADDITIONAL STUDIES:     EXAM:  CT ABDOMEN AND PELVIS                          PROCEDURE DATE:  2017        INTERPRETATION:  CT renal stone   (CT of the abdomen and pelvis without   contrast)      CLINICAL INFORMATION:      Acute cystitis. Incontinence. Infection.   Assess for pyelonephritis. TECHNIQUE:  Contiguous axial 5 mm sections   were obtained using single helical acquisition through the abdomen and   pelvis and were reconstructed as axial 5 mm sections.  Higher resolution   axial and coronal images were reconstructed through  the kidneys.   Oral   and intravenous contrast were withheld for this indication.      FINDINGS:   No previous examinations are available for review.    The lung bases are clear.         The liver demonstrates homogeneous attenuation with no focal lesion,   allowing for the noncontrast technique.  Hepatic size and contours are   maintained.  Hepatic and portal veins are not displaced.  No intrahepatic   or common ductal dilatation is recognized.  There is cholelithiasis withthe multiple lung gallstones without   secondary signs of acute cholecystitis.     The pancreas is intact without ductal dilatation or focal lesion.  The   spleen is normal in size.    No adrenal masses are found.    There is a staghorn no right renal calculus occupying renal calyces and   of the renal pelvis. Right upper pole 3.6 cm dilatation which may   represent an obstructed upper pole of calyx versus renal cyst. The right   ureter is normal in course and caliber of.There is focal right midpole   cortical atrophy likely result of scarring from prior infection. Acute   pyelonephritis cannot be excluded. No gross hydronephrosis seen.     Left kidney and collecting system unremarkable.  .. Liu catheter within collapsed bladder.  Status post hysterectomy.    No enlarged lymph nodes are found.  No ascites is present.  The osseous   structures are intact.    The bowel appears unremarkable.  No obstruction, perforation or abscess   is recognized.         The retroperitoneal vessels show atherosclerotic calcified plaques   throughout  nondilated abdominal aorta, mesenteric and iliac arteries.   IMPRESSION:   A right renal staghorn calculus with mid pole of cortical   scarring deformity of. 3.6 cm right upper pole cyst versus obstructed  upper pole collecting system.  Left kidney unremarkable. Liu catheter in place.  Cholelithiasis.  Acute right renal pyelonephritis cannot be excluded. No perinephric   stranding.                            DEBBY BROWN M.D., ATTENDING RADIOLOGIST  This document has been electronically signed. 2017  9:29AM

## 2017-06-18 NOTE — PROGRESS NOTE ADULT - PROBLEM SELECTOR PLAN 3
CT head negative  CW Aggrenox 25/200mg BID  CW Lipitor 20mg PO qhs -Initial CT head negative, repeat ct head negative, will do full work up to evaluate further for possible new stroke   -f/u tte and carotid duplex   -neurology is following the patient   -c/w  Aggrenox 25/200mg BID  -c/w Lipitor 20mg PO qhs

## 2017-06-18 NOTE — PROGRESS NOTE ADULT - PROBLEM SELECTOR PLAN 4
Accuchecks  Diabetic consistent carb diet  Humalog sliding scale  Observe FS and adjust insulin requirement as needed -FS stable  -c/w Accuchecks ACHSTID   -c/w Diabetic consistent carb diet  -c/w Humalog sliding scale

## 2017-06-18 NOTE — CONSULT NOTE ADULT - PROBLEM SELECTOR RECOMMENDATION 9
Altered mental status is most likely toxic encephalopathy related to UTI in a patient with chronic neuro deficits (prior stroke and dementia).  Unclear if anisocoria is new but it is mild and both pupils are reactive.  Repeat NCHCT will be done to r/o any new infarct missed on initial CT scan.  Continue supportive care and treatment of UTI with antibiotics.  Continue aspirin/dipyridamole  for secondary stroke prevention.  Will follow with you.

## 2017-06-18 NOTE — PROGRESS NOTE ADULT - PROBLEM SELECTOR PLAN 1
Initial Stroke work up in ER, patient has CT scan without any acute hemorrhage or obvious infarct. Changes correlate to previous stroke. Unlikely stroke, as more likely secondary to acute delirium secondary to acute cystitis. Patient appears to be back at baseline mentation and strength after ABx and IV hydration.   Rocephin 1g IV [id number] Day #2 of IV ABx  BUN/Cr show dehydration, s/p 2L NS bolus am labs pending  LR at 100cc/hr.  Ua and UCx  TSH  RPR  Folate level  B12 level acute encephalopathy. Altered mental status is most likely toxic encephalopathy secondary to UTI, will still rule out CVA given extensive prior hx. Neurology consulted.   Initial Stroke work up in ER, patient has CT scan without any acute hemorrhage or obvious infarct. Changes correlate to previous stroke.   -c/w empiric tx for UTI   -c/w IVF hydration given prerenal azotemia   -TSH wnl  -f/u RPR  -Folate level wnl  -B12 level wnl

## 2017-06-18 NOTE — PROGRESS NOTE ADULT - SUBJECTIVE AND OBJECTIVE BOX
CC: AMS (17 Jun 2017 21:29)    Patient seen and examined at bedside, No acute overnight events. Patient appears to be confused and anxious this morning however is easily redirected.   Patient not ambulating however she is eating well, +earl.  Cardiac monitor reviewed;    Review of Systems:  General: NAD, no confusion  Skin/Breast: +SCC on left side of forehead that was recently removed	  Ophthalmologic: No changes in vision	  ENMT: No difficulty swallowing, swelling in throat  Respiratory and Thorax: No wheezing, cough or SOB	  Cardiovascular: No chest pain, SOB, palpitations or pedal edema  Gastrointestinal: No constipation/diarrhea  Genitourinary: +increased urinary frequency, no dysuria  Musculoskeletal: +left sided residual weakness in upper ext and lower extremity.  Neurological: Focal deficits in left upper and lower ext, mentation intact  Psychiatric: No sadness, agustín or hysteria  Hematology/Lymphatics: No bleeding or swelling  Endocrine: No heat/cold intolerance    VS:   Vital Signs Last 24 Hrs  T(C): 37.4, Max: 38.7 (06-17 @ 17:49)  T(F): 99.4, Max: 101.7 (06-17 @ 17:49)  HR: 97 (76 - 105)  BP: 143/74 (135/62 - 162/64)  RR: 18 (16 - 19)  SpO2: 97% (95% - 98%)    Physical Exam:   Gen: NAD  HEENT: NCAT, PERRLA, Pupils 2mm bilaterally  CVS: RRR, +s1/s2, no murmurs appreciated.   Lungs: Good air entry bilaterally, CTAB, no wheeze/rales/rhonchi  Abd: +BS, soft, NT, ND  Ext: No cyanosis, edema or calf tenderness, +DP/PT pulses bilaterally  Neuro: Awake, Alert and Oriented to person and place however not to time. CNII-XII grossly intact, motor strength 5/5 in upper right extremity and 3/5 in left upper extremity and 5/5 in lower right extremity and 2/5 in lower left extremity, sensation intact in bilateral upper and lower extremities. Unable to assess Gait.  Skin: No ulcers, rashes or skin breaks.    Labs:                        13.0   15.6  )-----------( 221      ( 17 Jun 2017 17:19 )             38.8   06-17    136  |  96<L>  |  25.0<H>  ----------------------------<  268<H>  4.7   |  20.0<L>  |  1.19    Ca    9.5      17 Jun 2017 17:19    TPro  7.5  /  Alb  4.2  /  TBili  0.7  /  DBili  x   /  AST  28  /  ALT  18  /  AlkPhos  115  06-17      Radiology:  EXAM:  CT BRAIN STROKE PROTOCOL                        PROCEDURE DATE:  06/17/2017    INTERPRETATION:  Head CT without contrast   COMPARISON: None.  CLINICAL INFORMATION:  CVA symptoms. Acute CVA. Stroke protocol..  TECHNIQUE: Contiguous axial 2.5 mm slice thickness images of the head   were obtained without the use of intravenous contrast media.  FINDINGS:  Moderate cerebral volume loss is present with secondary proportional   prominence of the sulci and ventricles.  The posterior fossa structures and basal cisterns appear normal.  There is no intracranial hemorrhage.   There is no intracranial mass or midline shift.  There is no sulcal effacement to suggest acute stroke.  There are scattered white matter hypodensities consistent with small vessel disease.  Bilateral basal ganglia chronic lacunar infarcts noted.  The visualized portions of the optic globes and paranasal sinuses are normal.  There are no skull fractures.  IMPRESSION:  No acute intracranial findings.  Moderate atrophy and chronic white matter microvascular ischemic changes   as described.   If acute stroke is of clinical concern, MRI with diffusion-weighted   images would be helpful for further characterization.    Medications:  MEDICATIONS  (STANDING):  enoxaparin Injectable 40milliGRAM(s) SubCutaneous every 24 hours  cefTRIAXone   IVPB 1Gram(s) IV Intermittent every 24 hours  sertraline 100milliGRAM(s) Oral daily  atorvastatin 20milliGRAM(s) Oral at bedtime  dipyridamole 200 mG/aspirin 25 mG 1Capsule(s) Oral two times a day  insulin lispro (HumaLOG) corrective regimen sliding scale  SubCutaneous three times a day before meals  insulin lispro (HumaLOG) corrective regimen sliding scale  SubCutaneous at bedtime  lactated ringers. 1000milliLiter(s) IV Continuous <Continuous>    MEDICATIONS  (PRN):  acetaminophen   Tablet 650milliGRAM(s) Oral every 6 hours PRN For Temp greater than 38 C (100.4 F) CC: AMS (17 Jun 2017 21:29)    Patient seen and examined at bedside, No acute overnight events. Patient appears to be confused and anxious this morning however is easily redirected.   Patient not ambulating however she is eating well, +earl.      Review of Systems:  General: NAD   Skin/Breast: +SCC on left side of forehead that was recently removed	  Ophthalmologic: No changes in vision	  ENMT: No difficulty swallowing, swelling in throat  Respiratory and Thorax: No wheezing, cough or SOB	  Cardiovascular: No chest pain, SOB, palpitations or pedal edema  Gastrointestinal: No constipation/diarrhea  Genitourinary: +increased urinary frequency, no dysuria  Musculoskeletal: +left sided residual weakness in upper ext and lower extremity.  Neurological: Focal deficits in left upper and lower ext, mentation intact  Psychiatric: No sadness, agustín or hysteria  Hematology/Lymphatics: No bleeding or swelling  Endocrine: No heat/cold intolerance    VS:   Vital Signs Last 24 Hrs  T(C): 37.4, Max: 38.7 (06-17 @ 17:49)  T(F): 99.4, Max: 101.7 (06-17 @ 17:49)  HR: 97 (76 - 105)  BP: 143/74 (135/62 - 162/64)  RR: 18 (16 - 19)  SpO2: 97% (95% - 98%)    Physical Exam:   Gen: NAD  HEENT: NCAT, PERRLA, anisocoria -mild and both pupils are reactive.  CVS: RRR, +s1/s2, no murmurs appreciated.   Lungs: Good air entry bilaterally, CTAB, no wheeze/rales/rhonchi  Abd: +BS, soft, NT, ND  Ext: No cyanosis, edema or calf tenderness, +DP/PT pulses bilaterally  Neuro: Awake, Alert and confused. CNII-XII grossly intact, motor strength 5/5 in upper right extremity and 3/5 in left upper extremity and 5/5 in lower right extremity and 2/5 in lower left extremity, sensation intact in bilateral upper and lower extremities. Unable to assess Gait.  Skin: No ulcers, rashes or skin breaks.    Labs:                        13.0   15.6  )-----------( 221      ( 17 Jun 2017 17:19 )             38.8   06-17    136  |  96<L>  |  25.0<H>  ----------------------------<  268<H>  4.7   |  20.0<L>  |  1.19    Ca    9.5      17 Jun 2017 17:19    TPro  7.5  /  Alb  4.2  /  TBili  0.7  /  DBili  x   /  AST  28  /  ALT  18  /  AlkPhos  115  06-17      Radiology:  EXAM:  CT BRAIN STROKE PROTOCOL                        PROCEDURE DATE:  06/17/2017    INTERPRETATION:  Head CT without contrast   COMPARISON: None.  CLINICAL INFORMATION:  CVA symptoms. Acute CVA. Stroke protocol..  TECHNIQUE: Contiguous axial 2.5 mm slice thickness images of the head   were obtained without the use of intravenous contrast media.  FINDINGS:  Moderate cerebral volume loss is present with secondary proportional   prominence of the sulci and ventricles.  The posterior fossa structures and basal cisterns appear normal.  There is no intracranial hemorrhage.   There is no intracranial mass or midline shift.  There is no sulcal effacement to suggest acute stroke.  There are scattered white matter hypodensities consistent with small vessel disease.  Bilateral basal ganglia chronic lacunar infarcts noted.  The visualized portions of the optic globes and paranasal sinuses are normal.  There are no skull fractures.  IMPRESSION:  No acute intracranial findings.  Moderate atrophy and chronic white matter microvascular ischemic changes   as described.   If acute stroke is of clinical concern, MRI with diffusion-weighted   images would be helpful for further characterization.    Medications:  MEDICATIONS  (STANDING):  enoxaparin Injectable 40milliGRAM(s) SubCutaneous every 24 hours  cefTRIAXone   IVPB 1Gram(s) IV Intermittent every 24 hours  sertraline 100milliGRAM(s) Oral daily  atorvastatin 20milliGRAM(s) Oral at bedtime  dipyridamole 200 mG/aspirin 25 mG 1Capsule(s) Oral two times a day  insulin lispro (HumaLOG) corrective regimen sliding scale  SubCutaneous three times a day before meals  insulin lispro (HumaLOG) corrective regimen sliding scale  SubCutaneous at bedtime  lactated ringers. 1000milliLiter(s) IV Continuous <Continuous>    MEDICATIONS  (PRN):  acetaminophen   Tablet 650milliGRAM(s) Oral every 6 hours PRN For Temp greater than 38 C (100.4 F)

## 2017-06-18 NOTE — PROGRESS NOTE ADULT - ASSESSMENT
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 Concerning for UTI. 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 Altered mental status is most likely toxic encephalopathy secondary to UTI, will still rule out CVA given extensive prior hx. Neurology consulted.

## 2017-06-18 NOTE — PROGRESS NOTE ADULT - PROBLEM SELECTOR PLAN 2
Acute cystitis as mentioned above  s/p Zosyn IV x 1 dose in ER  CW Rocephin 1g IV q24h [place] #2 of IV abx  UCx and Ua  CT abdomen to rule out pyelonephritis AMS can be secondary to Acute cystitis    -s/p Zosyn IV x 1 dose in ER  -c/w Rocephin 1g IV q24h [place] #2 of IV abx  -UCx pending results  -CT abd/pelvis shows a right renal staghorn calculus with mid pole of cortical   scarring deformity of. 3.6 cm right upper pole cyst versus obstructed upper pole collecting system. Acute right renal pyelonephritis cannot be excluded. No perinephric stranding.  -urology consulted

## 2017-06-19 LAB
ANION GAP SERPL CALC-SCNC: 13 MMOL/L — SIGNIFICANT CHANGE UP (ref 5–17)
BASOPHILS # BLD AUTO: 0 K/UL — SIGNIFICANT CHANGE UP (ref 0–0.2)
BASOPHILS NFR BLD AUTO: 0.3 % — SIGNIFICANT CHANGE UP (ref 0–2)
BUN SERPL-MCNC: 15 MG/DL — SIGNIFICANT CHANGE UP (ref 8–20)
CALCIUM SERPL-MCNC: 8.5 MG/DL — LOW (ref 8.6–10.2)
CHLORIDE SERPL-SCNC: 101 MMOL/L — SIGNIFICANT CHANGE UP (ref 98–107)
CO2 SERPL-SCNC: 22 MMOL/L — SIGNIFICANT CHANGE UP (ref 22–29)
CREAT SERPL-MCNC: 1.16 MG/DL — SIGNIFICANT CHANGE UP (ref 0.5–1.3)
CULTURE RESULTS: SIGNIFICANT CHANGE UP
EOSINOPHIL # BLD AUTO: 0 K/UL — SIGNIFICANT CHANGE UP (ref 0–0.5)
EOSINOPHIL NFR BLD AUTO: 0.4 % — SIGNIFICANT CHANGE UP (ref 0–6)
GLUCOSE SERPL-MCNC: 174 MG/DL — HIGH (ref 70–115)
HCT VFR BLD CALC: 30.6 % — LOW (ref 37–47)
HGB BLD-MCNC: 10.1 G/DL — LOW (ref 12–16)
LYMPHOCYTES # BLD AUTO: 2.5 K/UL — SIGNIFICANT CHANGE UP (ref 1–4.8)
LYMPHOCYTES # BLD AUTO: 21.1 % — SIGNIFICANT CHANGE UP (ref 20–55)
MAGNESIUM SERPL-MCNC: 1.8 MG/DL — SIGNIFICANT CHANGE UP (ref 1.6–2.6)
MCHC RBC-ENTMCNC: 28.6 PG — SIGNIFICANT CHANGE UP (ref 27–31)
MCHC RBC-ENTMCNC: 33 G/DL — SIGNIFICANT CHANGE UP (ref 32–36)
MCV RBC AUTO: 86.7 FL — SIGNIFICANT CHANGE UP (ref 81–99)
MONOCYTES # BLD AUTO: 1.4 K/UL — HIGH (ref 0–0.8)
MONOCYTES NFR BLD AUTO: 12 % — HIGH (ref 3–10)
NEUTROPHILS # BLD AUTO: 7.7 K/UL — SIGNIFICANT CHANGE UP (ref 1.8–8)
NEUTROPHILS NFR BLD AUTO: 65.9 % — SIGNIFICANT CHANGE UP (ref 37–73)
PHOSPHATE SERPL-MCNC: 2.9 MG/DL — SIGNIFICANT CHANGE UP (ref 2.4–4.7)
PLATELET # BLD AUTO: 188 K/UL — SIGNIFICANT CHANGE UP (ref 150–400)
POTASSIUM SERPL-MCNC: 3.4 MMOL/L — LOW (ref 3.5–5.3)
POTASSIUM SERPL-SCNC: 3.4 MMOL/L — LOW (ref 3.5–5.3)
PREALB SERPL-MCNC: 11 MG/DL — LOW (ref 18–38)
RBC # BLD: 3.53 M/UL — LOW (ref 4.4–5.2)
RBC # FLD: 13.5 % — SIGNIFICANT CHANGE UP (ref 11–15.6)
SODIUM SERPL-SCNC: 136 MMOL/L — SIGNIFICANT CHANGE UP (ref 135–145)
SPECIMEN SOURCE: SIGNIFICANT CHANGE UP
WBC # BLD: 11.7 K/UL — HIGH (ref 4.8–10.8)
WBC # FLD AUTO: 11.7 K/UL — HIGH (ref 4.8–10.8)

## 2017-06-19 PROCEDURE — 99233 SBSQ HOSP IP/OBS HIGH 50: CPT | Mod: GC

## 2017-06-19 RX ORDER — POTASSIUM CHLORIDE 20 MEQ
40 PACKET (EA) ORAL ONCE
Qty: 0 | Refills: 0 | Status: COMPLETED | OUTPATIENT
Start: 2017-06-19 | End: 2017-06-19

## 2017-06-19 RX ADMIN — ASPIRIN AND DIPYRIDAMOLE 1 CAPSULE(S): 25; 200 CAPSULE, EXTENDED RELEASE ORAL at 05:24

## 2017-06-19 RX ADMIN — Medication 100 MILLIGRAM(S): at 12:31

## 2017-06-19 RX ADMIN — INSULIN GLARGINE 5 UNIT(S): 100 INJECTION, SOLUTION SUBCUTANEOUS at 22:44

## 2017-06-19 RX ADMIN — Medication 40 MILLIEQUIVALENT(S): at 12:31

## 2017-06-19 RX ADMIN — Medication 500 MILLIGRAM(S): at 12:31

## 2017-06-19 RX ADMIN — Medication 1 TABLET(S): at 12:31

## 2017-06-19 RX ADMIN — SERTRALINE 100 MILLIGRAM(S): 25 TABLET, FILM COATED ORAL at 12:31

## 2017-06-19 RX ADMIN — MAGNESIUM OXIDE 400 MG ORAL TABLET 400 MILLIGRAM(S): 241.3 TABLET ORAL at 12:31

## 2017-06-19 RX ADMIN — ATORVASTATIN CALCIUM 20 MILLIGRAM(S): 80 TABLET, FILM COATED ORAL at 22:44

## 2017-06-19 RX ADMIN — Medication 1 TABLET(S): at 08:15

## 2017-06-19 RX ADMIN — CEFTRIAXONE 100 GRAM(S): 500 INJECTION, POWDER, FOR SOLUTION INTRAMUSCULAR; INTRAVENOUS at 22:44

## 2017-06-19 RX ADMIN — Medication 5 MILLIGRAM(S): at 12:31

## 2017-06-19 RX ADMIN — ASPIRIN AND DIPYRIDAMOLE 1 CAPSULE(S): 25; 200 CAPSULE, EXTENDED RELEASE ORAL at 17:07

## 2017-06-19 RX ADMIN — ENOXAPARIN SODIUM 40 MILLIGRAM(S): 100 INJECTION SUBCUTANEOUS at 22:44

## 2017-06-19 RX ADMIN — Medication 5: at 12:31

## 2017-06-19 RX ADMIN — Medication 2: at 17:07

## 2017-06-19 NOTE — PROGRESS NOTE ADULT - PROBLEM SELECTOR PLAN 2
acute encephalopathy. Resolved.  obvious infarct. Changes correlate to previous stroke.   -c/w empiric tx for UTI acute encephalopathy. Resolved.  -c/w empiric tx for UTI

## 2017-06-19 NOTE — PROGRESS NOTE ADULT - PROBLEM SELECTOR PLAN 1
Pyelonephritis with staghorn calculi  Pt clinically improved.  -c/w Rocephin 1g IV antibiotics day #3  -f/u UCx pending results  -CT abd/pelvis shows a right renal staghorn calculus with mid pole of cortical   scarring deformity of. 3.6 cm right upper pole cyst versus obstructed upper pole collecting system.   -urology consulted and recommends no surgical intervention at this time but to treat for pyelonephritis. Pyelonephritis with staghorn calculi  Pt clinically improved. Afebrile and leukocytosis trending down.   -c/w Rocephin 1g IV antibiotics day #3  -Ucx shows <[id number] GNR and Bcx pending results   -CT abd/pelvis shows a right renal staghorn calculus with mid pole of cortical   scarring deformity of. 3.6 cm right upper pole cyst versus obstructed upper pole collecting system.   -urology consulted and recommends no surgical intervention at this time but to treat for pyelonephritis.

## 2017-06-19 NOTE — PROGRESS NOTE ADULT - SUBJECTIVE AND OBJECTIVE BOX
CC: AMS (2017 21:29)    Interval/Overnight event  Patient seen and examined at bedside with no acute overnight events. Pt seems pleasant and able to answer question. As per nurse, pt's son came by yesterday and pt was verbal and talking. Pt denies having any dysuria, abdominal pain, vomiting or fever. Son brought in burger which pt was able to eat. As per nurse, pt had no BM overnight, is voiding on pad. Liu has since been removed.       Review of Systems: Neg except as above.    VS:   Vital Signs Last 24 Hrs  T(C): 37.3, Max: 38.8 (- @ 07:42)  T(F): 99.2, Max: 101.8 ( @ 07:42)  HR: 98 (86 - 102)  BP: 127/59 (106/66 - 143/59)  RR: 18 (17 - 18)  SpO2: 98% (97% - 98%)    Physical Exam:   Gen: NAD  HEENT: NCAT, PERRLA, anisocoria -mild and both pupils are reactive.  CVS: RRR, +s1/s2, no murmurs appreciated.   Lungs: Good air entry bilaterally, CTAB, no wheeze/rales/rhonchi  Abd: +BS, soft, NT, ND  Ext: No cyanosis, edema or calf tenderness, +DP/PT pulses bilaterally  Neuro: Awake, Alert and confused. CNII-XII grossly intact, motor strength 5/5 in upper right extremity and 3/5 in left upper extremity and 5/5 in lower right extremity and 2/5 in lower left extremity, sensation intact in bilateral upper and lower extremities. Unable to assess Gait.  Skin: No ulcers, rashes or skin breaks.    Labs:                                   10.8   14.9  )-----------( 197      ( 2017 08:30 )             32.7   06-18    136  |  98  |  18.0  ----------------------------<  105  3.8   |  19.0<L>  |  0.98    Ca    9.0      2017 16:09  Phos  2.3     06-18  Mg     2.4     06-18    TPro  7.5  /  Alb  4.2  /  TBili  0.7  /  DBili  x   /  AST  28  /  ALT  18  /  AlkPhos  115  06-17   PT/INR - ( 2017 17:19 )   PT: 12.2 sec;   INR: 1.11 ratio         PTT - ( 2017 17:19 )  PTT:25.8 sec  Urinalysis Basic - ( 2017 02:52 )    Color: Yellow / Appearance: Slightly Turbid / S.005 / pH: x  Gluc: x / Ketone: Trace  / Bili: Negative / Urobili: Negative mg/dL   Blood: x / Protein: 15 mg/dL / Nitrite: Negative   Leuk Esterase: Moderate / RBC: 3-5 /HPF / WBC 6-10   Sq Epi: x / Non Sq Epi: Occasional / Bacteria: Occasional        Radiology:  No new imaging.    Medications:  MEDICATIONS  (STANDING):  enoxaparin Injectable 40milliGRAM(s) SubCutaneous every 24 hours  cefTRIAXone   IVPB 1Gram(s) IV Intermittent every 24 hours  atorvastatin 20milliGRAM(s) Oral at bedtime  dipyridamole 200 mG/aspirin 25 mG 1Capsule(s) Oral two times a day  insulin lispro (HumaLOG) corrective regimen sliding scale  SubCutaneous three times a day before meals  insulin lispro (HumaLOG) corrective regimen sliding scale  SubCutaneous at bedtime  dextrose 5%. 1000milliLiter(s) IV Continuous <Continuous>  dextrose 50% Injectable 12.5Gram(s) IV Push once  dextrose 50% Injectable 25Gram(s) IV Push once  dextrose 50% Injectable 25Gram(s) IV Push once  oxybutynin XL 5milliGRAM(s) Oral daily  lactated ringers. 1000milliLiter(s) IV Continuous <Continuous>  potassium acid phosphate/sodium acid phosphate tablet (K-PHOS No. 2) 1Tablet(s) Oral four times a day with meals  multivitamin 1Tablet(s) Oral daily  sertraline 100milliGRAM(s) Oral daily  insulin glargine Injectable (LANTUS) 5Unit(s) SubCutaneous at bedtime  ascorbic acid 500milliGRAM(s) Oral daily  thiamine 100milliGRAM(s) Oral daily  magnesium oxide 400milliGRAM(s) Oral daily    MEDICATIONS  (PRN):  dextrose Gel 1Dose(s) Oral once PRN Blood Glucose LESS THAN 70 milliGRAM(s)/deciliter  glucagon  Injectable 1milliGRAM(s) IntraMuscular once PRN Glucose LESS THAN 70 milligrams/deciliter  acetaminophen   Tablet 650milliGRAM(s) Oral every 6 hours PRN For Temp greater than 38 C (100.4 F)

## 2017-06-19 NOTE — PROGRESS NOTE ADULT - PROBLEM SELECTOR PLAN 3
-Initial CT head negative, repeat ct head negative  -f/u tte and carotid duplex to r/o possiblity of new stroke  -neurology is following the patient   -c/w  Aggrenox 25/200mg BID  -c/w Lipitor 20mg PO qhs -Initial CT head negative, repeat ct head negative  -carotid duplex does not show significant stenosis   -f/u tte   -neurology is following the patient   -c/w  Aggrenox 25/200mg BID  -c/w Lipitor 20mg PO qhs

## 2017-06-19 NOTE — PROGRESS NOTE ADULT - ASSESSMENT
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 Altered mental status is most likely toxic encephalopathy secondary to UTI. CVA was possibility given extensive prior hx however, CT head negative and pt symptom improved. Neurology consulted and recommended no further intervention. Urology consulted after CT abdomen done showed staghorn calculi and recommends no surgical intervention at this time but to treat for pyelonephritis. 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 Altered mental status is most likely toxic encephalopathy secondary to UTI. CVA was possibility given extensive prior hx however, CT head negative and pt symptom improved. Neurology consulted and recommended no further intervention. Encephalopathy resolved and patient is at her baseline.  Urology consulted after CT abdomen done showed staghorn calculi and recommends no surgical intervention at this time but to treat for pyelonephritis. 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 Patient had sepsis secondary to UTI on admission which has now resolved. Altered mental status is most likely toxic encephalopathy secondary to UTI. CVA was possibility given extensive prior hx however, CT head negative and pt symptom improved. Neurology consulted and recommended no further intervention. Encephalopathy resolved and patient is at her baseline.  Urology consulted after CT abdomen done showed staghorn calculi and recommends no surgical intervention at this time but to treat for pyelonephritis.

## 2017-06-20 LAB
ANION GAP SERPL CALC-SCNC: 16 MMOL/L — SIGNIFICANT CHANGE UP (ref 5–17)
BASOPHILS # BLD AUTO: 0 K/UL — SIGNIFICANT CHANGE UP (ref 0–0.2)
BASOPHILS NFR BLD AUTO: 0.3 % — SIGNIFICANT CHANGE UP (ref 0–2)
BUN SERPL-MCNC: 21 MG/DL — HIGH (ref 8–20)
C DIFF BY PCR RESULT: SIGNIFICANT CHANGE UP
C DIFF TOX GENS STL QL NAA+PROBE: SIGNIFICANT CHANGE UP
CALCIUM SERPL-MCNC: 8.8 MG/DL — SIGNIFICANT CHANGE UP (ref 8.6–10.2)
CHLORIDE SERPL-SCNC: 103 MMOL/L — SIGNIFICANT CHANGE UP (ref 98–107)
CO2 SERPL-SCNC: 21 MMOL/L — LOW (ref 22–29)
CREAT SERPL-MCNC: 1.11 MG/DL — SIGNIFICANT CHANGE UP (ref 0.5–1.3)
EOSINOPHIL # BLD AUTO: 0.1 K/UL — SIGNIFICANT CHANGE UP (ref 0–0.5)
EOSINOPHIL NFR BLD AUTO: 1.6 % — SIGNIFICANT CHANGE UP (ref 0–6)
GLUCOSE SERPL-MCNC: 247 MG/DL — HIGH (ref 70–115)
HCT VFR BLD CALC: 31.8 % — LOW (ref 37–47)
HGB BLD-MCNC: 10.3 G/DL — LOW (ref 12–16)
LYMPHOCYTES # BLD AUTO: 2.7 K/UL — SIGNIFICANT CHANGE UP (ref 1–4.8)
LYMPHOCYTES # BLD AUTO: 31 % — SIGNIFICANT CHANGE UP (ref 20–55)
MCHC RBC-ENTMCNC: 28.3 PG — SIGNIFICANT CHANGE UP (ref 27–31)
MCHC RBC-ENTMCNC: 32.4 G/DL — SIGNIFICANT CHANGE UP (ref 32–36)
MCV RBC AUTO: 87.4 FL — SIGNIFICANT CHANGE UP (ref 81–99)
MONOCYTES # BLD AUTO: 0.8 K/UL — SIGNIFICANT CHANGE UP (ref 0–0.8)
MONOCYTES NFR BLD AUTO: 9.1 % — SIGNIFICANT CHANGE UP (ref 3–10)
NEUTROPHILS # BLD AUTO: 5.1 K/UL — SIGNIFICANT CHANGE UP (ref 1.8–8)
NEUTROPHILS NFR BLD AUTO: 57.9 % — SIGNIFICANT CHANGE UP (ref 37–73)
PLATELET # BLD AUTO: 214 K/UL — SIGNIFICANT CHANGE UP (ref 150–400)
POTASSIUM SERPL-MCNC: 4.2 MMOL/L — SIGNIFICANT CHANGE UP (ref 3.5–5.3)
POTASSIUM SERPL-SCNC: 4.2 MMOL/L — SIGNIFICANT CHANGE UP (ref 3.5–5.3)
RBC # BLD: 3.64 M/UL — LOW (ref 4.4–5.2)
RBC # FLD: 13.5 % — SIGNIFICANT CHANGE UP (ref 11–15.6)
SODIUM SERPL-SCNC: 140 MMOL/L — SIGNIFICANT CHANGE UP (ref 135–145)
WBC # BLD: 8.8 K/UL — SIGNIFICANT CHANGE UP (ref 4.8–10.8)
WBC # FLD AUTO: 8.8 K/UL — SIGNIFICANT CHANGE UP (ref 4.8–10.8)

## 2017-06-20 PROCEDURE — 99233 SBSQ HOSP IP/OBS HIGH 50: CPT | Mod: GC

## 2017-06-20 RX ORDER — OXYBUTYNIN CHLORIDE 5 MG
1 TABLET ORAL
Qty: 0 | Refills: 0 | COMMUNITY
Start: 2017-06-20

## 2017-06-20 RX ORDER — THIAMINE MONONITRATE (VIT B1) 100 MG
1 TABLET ORAL
Qty: 0 | Refills: 0 | COMMUNITY
Start: 2017-06-20

## 2017-06-20 RX ORDER — ASCORBIC ACID 60 MG
1 TABLET,CHEWABLE ORAL
Qty: 0 | Refills: 0 | COMMUNITY
Start: 2017-06-20

## 2017-06-20 RX ORDER — ACETAMINOPHEN 500 MG
2 TABLET ORAL
Qty: 240 | Refills: 0 | OUTPATIENT
Start: 2017-06-20 | End: 2017-07-20

## 2017-06-20 RX ORDER — CEPHALEXIN 500 MG
1 CAPSULE ORAL
Qty: 22 | Refills: 0 | OUTPATIENT
Start: 2017-06-20 | End: 2017-07-01

## 2017-06-20 RX ORDER — SERTRALINE 25 MG/1
1 TABLET, FILM COATED ORAL
Qty: 0 | Refills: 0 | COMMUNITY
Start: 2017-06-20

## 2017-06-20 RX ORDER — ATORVASTATIN CALCIUM 80 MG/1
1 TABLET, FILM COATED ORAL
Qty: 0 | Refills: 0 | COMMUNITY
Start: 2017-06-20

## 2017-06-20 RX ORDER — ASPIRIN AND DIPYRIDAMOLE 25; 200 MG/1; MG/1
1 CAPSULE, EXTENDED RELEASE ORAL
Qty: 0 | Refills: 0 | COMMUNITY
Start: 2017-06-20

## 2017-06-20 RX ADMIN — Medication 1 TABLET(S): at 13:48

## 2017-06-20 RX ADMIN — Medication 1: at 08:19

## 2017-06-20 RX ADMIN — ASPIRIN AND DIPYRIDAMOLE 1 CAPSULE(S): 25; 200 CAPSULE, EXTENDED RELEASE ORAL at 17:25

## 2017-06-20 RX ADMIN — Medication: at 12:46

## 2017-06-20 RX ADMIN — MAGNESIUM OXIDE 400 MG ORAL TABLET 400 MILLIGRAM(S): 241.3 TABLET ORAL at 13:48

## 2017-06-20 RX ADMIN — Medication 5 MILLIGRAM(S): at 13:48

## 2017-06-20 RX ADMIN — Medication 100 MILLIGRAM(S): at 13:48

## 2017-06-20 RX ADMIN — INSULIN GLARGINE 5 UNIT(S): 100 INJECTION, SOLUTION SUBCUTANEOUS at 22:25

## 2017-06-20 RX ADMIN — SERTRALINE 100 MILLIGRAM(S): 25 TABLET, FILM COATED ORAL at 13:48

## 2017-06-20 RX ADMIN — ASPIRIN AND DIPYRIDAMOLE 1 CAPSULE(S): 25; 200 CAPSULE, EXTENDED RELEASE ORAL at 05:27

## 2017-06-20 RX ADMIN — ENOXAPARIN SODIUM 40 MILLIGRAM(S): 100 INJECTION SUBCUTANEOUS at 22:25

## 2017-06-20 RX ADMIN — ATORVASTATIN CALCIUM 20 MILLIGRAM(S): 80 TABLET, FILM COATED ORAL at 22:25

## 2017-06-20 RX ADMIN — Medication 500 MILLIGRAM(S): at 13:48

## 2017-06-20 RX ADMIN — CEFTRIAXONE 100 GRAM(S): 500 INJECTION, POWDER, FOR SOLUTION INTRAMUSCULAR; INTRAVENOUS at 22:25

## 2017-06-20 RX ADMIN — Medication 2: at 17:25

## 2017-06-20 NOTE — PROGRESS NOTE ADULT - PROBLEM SELECTOR PLAN 3
-Initial CT head negative, repeat ct head negative  -carotid duplex does not show significant stenosis   -f/u tte still pending. Echo lab called  -neurology is following the patient   -c/w  Aggrenox 25/200mg BID  -c/w Lipitor 20mg PO qhs -Initial CT head negative, repeat ct head negative  -carotid duplex does not show significant stenosis   -f/u tte still pending. Echo lab called and echo completed but not read   -c/w  Aggrenox 25/200mg BID  -c/w Lipitor 20mg PO qhs -FS stable  -c/w Accuchecks   -c/w Diabetic consistent carb diet  -c/w Humalog sliding scale

## 2017-06-20 NOTE — DISCHARGE NOTE ADULT - MEDICATION SUMMARY - MEDICATIONS TO TAKE
I will START or STAY ON the medications listed below when I get home from the hospital:    sertraline 100 mg oral tablet  -- 1 tab(s) by mouth once a day  -- Indication: For Antidepressant    Januvia 50 mg oral tablet  -- 1 tab(s) by mouth once a day  -- Indication: For Diabetes    metFORMIN 500 mg oral tablet  -- 1 tab(s) by mouth 2 times a day  -- Indication: For Diabetes    atorvastatin 20 mg oral tablet  -- 1 tab(s) by mouth once a day (at bedtime)  -- Indication: For Hld    aspirin-dipyridamole 25 mg-200 mg oral capsule, extended release  -- 1 cap(s) by mouth 2 times a day  -- Indication: For coronary artery disease    oxybutynin 5 mg/24 hours oral tablet, extended release  -- 1 tab(s) by mouth once a day  -- Indication: For Incontinence in female    Multiple Vitamins oral tablet  -- 1 tab(s) by mouth once a day  -- Indication: For vitamin    ascorbic acid 500 mg oral tablet  -- 1 tab(s) by mouth once a day  -- Indication: For vitamin    thiamine 100 mg oral tablet  -- 1 tab(s) by mouth once a day  -- Indication: For vitamin I will START or STAY ON the medications listed below when I get home from the hospital:    sertraline 100 mg oral tablet  -- 1 tab(s) by mouth once a day  -- Indication: For Antidepressant    Januvia 50 mg oral tablet  -- 1 tab(s) by mouth once a day  -- Indication: For Diabetes    metFORMIN 500 mg oral tablet  -- 1 tab(s) by mouth 2 times a day  -- Indication: For Diabetes    atorvastatin 20 mg oral tablet  -- 1 tab(s) by mouth once a day (at bedtime)  -- Indication: For Hld    aspirin-dipyridamole 25 mg-200 mg oral capsule, extended release  -- 1 cap(s) by mouth 2 times a day  -- Indication: For coronary artery disease    Keflex 500 mg oral capsule  -- 1 cap(s) by mouth 2 times a day x 10 days   -- Indication: For pyelonephritis     oxybutynin 5 mg/24 hours oral tablet, extended release  -- 1 tab(s) by mouth once a day  -- Indication: For Incontinence in female    Multiple Vitamins oral tablet  -- 1 tab(s) by mouth once a day  -- Indication: For vitamin    ascorbic acid 500 mg oral tablet  -- 1 tab(s) by mouth once a day  -- Indication: For vitamin    thiamine 100 mg oral tablet  -- 1 tab(s) by mouth once a day  -- Indication: For vitamin

## 2017-06-20 NOTE — PROGRESS NOTE ADULT - SUBJECTIVE AND OBJECTIVE BOX
Patient is a 82y Female admitted with AMS (17 Jun 2017 21:29)    Patient seen and examined at bedside, No acute overnight events. Pt denies having any discomfort and says she feels fine. As per nurse, she was able to communicate her needs and perky with son at bedside yesterday. Patient  is eating well and ate 75 to 100% of her meals with assistance. She is voiding and last BM was overnight.    Pt denies fever, chest pain, SOB, abdominal pain, diarrhea, constipation, calf pain.    VS:   Vital Signs Last 24 Hrs  T(C): 37.1, Max: 37.2 (06-19 @ 16:33)  T(F): 98.8, Max: 98.9 (06-19 @ 16:33)  HR: 80 (73 - 80)  BP: 130/64 (113/61 - 130/64)  RR: 18 (18 - 18)  SpO2: 98% (98% - 98%)    Physical Exam:     Physical Exam:   Gen: NAD  HEENT: NCAT, PERRLA, anisocoria -mild and both pupils are reactive.  CVS: RRR, +s1/s2, no murmurs appreciated.   Lungs: Good air entry bilaterally, CTAB, no wheeze/rales/rhonchi  Abd: +BS, soft, NT, ND  Ext: No cyanosis, edema or calf tenderness, +DP/PT pulses bilaterally. left knee lesion with no exudate.   Neuro: Awake, Alert and confused. CNII-XII grossly intact, motor strength 5/5 in upper right extremity and 3/5 in left upper extremity and 5/5 in lower right extremity and 2/5 in lower left extremity, sensation intact in bilateral upper and lower extremities. Unable to assess Gait.  Skin: No ulcers, rashes or skin breaks.    Labs:                        10.1   11.7  )-----------( 188      ( 19 Jun 2017 08:05 )             30.6   06-19    136  |  101  |  15.0  ----------------------------<  174<H>  3.4<L>   |  22.0  |  1.16    Ca    8.5<L>      19 Jun 2017 08:05  Phos  2.9     06-19  Mg     1.8     06-19        I & Os for current day (as of 06-20 @ 07:57)  =============================================  IN: 1430 ml / OUT: 0 ml / NET: 1430 ml      Radiology:  No new imaging    Medications:  MEDICATIONS  (STANDING):  enoxaparin Injectable 40milliGRAM(s) SubCutaneous every 24 hours  cefTRIAXone   IVPB 1Gram(s) IV Intermittent every 24 hours  atorvastatin 20milliGRAM(s) Oral at bedtime  dipyridamole 200 mG/aspirin 25 mG 1Capsule(s) Oral two times a day  insulin lispro (HumaLOG) corrective regimen sliding scale  SubCutaneous three times a day before meals  insulin lispro (HumaLOG) corrective regimen sliding scale  SubCutaneous at bedtime  dextrose 5%. 1000milliLiter(s) IV Continuous <Continuous>  dextrose 50% Injectable 12.5Gram(s) IV Push once  dextrose 50% Injectable 25Gram(s) IV Push once  dextrose 50% Injectable 25Gram(s) IV Push once  oxybutynin XL 5milliGRAM(s) Oral daily  multivitamin 1Tablet(s) Oral daily  sertraline 100milliGRAM(s) Oral daily  insulin glargine Injectable (LANTUS) 5Unit(s) SubCutaneous at bedtime  ascorbic acid 500milliGRAM(s) Oral daily  thiamine 100milliGRAM(s) Oral daily  magnesium oxide 400milliGRAM(s) Oral daily    MEDICATIONS  (PRN):  dextrose Gel 1Dose(s) Oral once PRN Blood Glucose LESS THAN 70 milliGRAM(s)/deciliter  glucagon  Injectable 1milliGRAM(s) IntraMuscular once PRN Glucose LESS THAN 70 milligrams/deciliter  acetaminophen   Tablet 650milliGRAM(s) Oral every 6 hours PRN For Temp greater than 38 C (100.4 F)

## 2017-06-20 NOTE — PROGRESS NOTE ADULT - PROBLEM SELECTOR PLAN 4
-FS stable  -c/w Accuchecks   -c/w Diabetic consistent carb diet  -c/w Humalog sliding scale -c/w Liptor 20mg PO qhs

## 2017-06-20 NOTE — PROGRESS NOTE ADULT - PROBLEM SELECTOR PROBLEM 4
Type 2 diabetes mellitus without complication, without long-term current use of insulin High cholesterol

## 2017-06-20 NOTE — DISCHARGE NOTE ADULT - CARE PLAN
Principal Discharge DX:	Encephalopathy acute  Goal:	resolved  Secondary Diagnosis:	Acute pyelonephritis  Goal:	resolution  Secondary Diagnosis:	Staghorn calculus  Goal:	stable  Instructions for follow-up, activity and diet:	Chronic finding  F.u urology outpatient  Secondary Diagnosis:	History of CVA (cerebrovascular accident)  Goal:	prevent future CVA  Instructions for follow-up, activity and diet:	Rule out with physical and imaging studies  c/w current meds  Secondary Diagnosis:	Type 2 diabetes mellitus without complication, without long-term current use of insulin  Goal:	glycemic control  Instructions for follow-up, activity and diet:	c/w meds as listed above Principal Discharge DX:	Encephalopathy acute  Goal:	resolved  Instructions for follow-up, activity and diet:	Resolved. Continue antibiotics Keflex 500mg BID as prescribed for 11 more days.  Secondary Diagnosis:	Acute pyelonephritis  Goal:	resolution  Instructions for follow-up, activity and diet:	Staghorn calculi noted in CT and urology consulted with no plans for further intervention. Continue antibiotics Keflex 500mg BID as prescribed for 11 more days.  Secondary Diagnosis:	Staghorn calculus  Goal:	stable  Instructions for follow-up, activity and diet:	Chronic finding. F.u urology outpatient  Secondary Diagnosis:	History of CVA (cerebrovascular accident)  Goal:	prevent future CVA  Instructions for follow-up, activity and diet:	Ruled out with physical and imaging studies. c/w current meds and f/u with PMD.  Secondary Diagnosis:	Type 2 diabetes mellitus without complication, without long-term current use of insulin  Goal:	glycemic control  Instructions for follow-up, activity and diet:	c/w meds as listed above. Consistent carb diet. F/u with PMD for evaluation of Metformin med and need for continuation with advancing age. Principal Discharge DX:	Encephalopathy acute  Goal:	resolved  Instructions for follow-up, activity and diet:	Resolved. Continue antibiotics Keflex 500mg BID. End date 7/1/17  Secondary Diagnosis:	Acute pyelonephritis  Goal:	resolution  Instructions for follow-up, activity and diet:	Staghorn calculi noted in CT and urology consulted with no plans for further intervention. Continue antibiotics Keflex 500mg BID as prescribed for 11 more days.  Secondary Diagnosis:	Staghorn calculus  Goal:	stable  Instructions for follow-up, activity and diet:	Chronic finding. F.u urology outpatient  Secondary Diagnosis:	History of CVA (cerebrovascular accident)  Goal:	prevent future CVA  Instructions for follow-up, activity and diet:	Ruled out with physical and imaging studies. c/w current meds and f/u with PMD.  Secondary Diagnosis:	Type 2 diabetes mellitus without complication, without long-term current use of insulin  Goal:	glycemic control  Instructions for follow-up, activity and diet:	c/w meds as listed above. Consistent carb diet. F/u with PMD for evaluation of Metformin med and need for continuation with advancing age. Principal Discharge DX:	Encephalopathy acute  Goal:	Prevent further episodes  Instructions for follow-up, activity and diet:	Resolved  Secondary Diagnosis:	Acute pyelonephritis  Goal:	resolution  Instructions for follow-up, activity and diet:	Staghorn calculi noted on CT and urology consulted with no plans for further intervention. This is a chronic finding. Continue antibiotics Keflex 500mg BID as prescribed for 10 more days. End date 7/1/17  Secondary Diagnosis:	Staghorn calculus  Goal:	stable  Instructions for follow-up, activity and diet:	Chronic finding. Follow up with urology as an outpatient only if symtpoms worsen.  Secondary Diagnosis:	History of CVA (cerebrovascular accident)  Goal:	prevent future CVA  Instructions for follow-up, activity and diet:	Ruled out with physical and imaging studies. c/w current meds and f/u with PMD.  Secondary Diagnosis:	Type 2 diabetes mellitus without complication, without long-term current use of insulin  Goal:	glycemic control  Instructions for follow-up, activity and diet:	c/w meds as listed above. Consistent carb diet. Follow up with accuchecks as an outpatient and continue with monitoring with your primary care physician.

## 2017-06-20 NOTE — DISCHARGE NOTE ADULT - PATIENT PORTAL LINK FT
“You can access the Follow[facility] Patient Portal, offered by [facility], by registering with the following website: [email or web address]”

## 2017-06-20 NOTE — PROGRESS NOTE ADULT - PROBLEM SELECTOR PROBLEM 3
History of CVA (cerebrovascular accident) Type 2 diabetes mellitus without complication, without long-term current use of insulin

## 2017-06-20 NOTE — PHYSICAL THERAPY INITIAL EVALUATION ADULT - ADDITIONAL COMMENTS
Per patient, she ambulates with a RW at home and has 3 steps to enter. She is able to ambulate without physical assistance for short distances within the home.

## 2017-06-20 NOTE — DISCHARGE NOTE ADULT - PLAN OF CARE
resolved resolution stable Chronic finding  F.u urology outpatient prevent future CVA Rule out with physical and imaging studies  c/w current meds glycemic control c/w meds as listed above Resolved. Continue antibiotics Keflex 500mg BID as prescribed for 11 more days. Staghorn calculi noted in CT and urology consulted with no plans for further intervention. Continue antibiotics Keflex 500mg BID as prescribed for 11 more days. Chronic finding. F.u urology outpatient Ruled out with physical and imaging studies. c/w current meds and f/u with PMD. c/w meds as listed above. Consistent carb diet. F/u with PMD for evaluation of Metformin med and need for continuation with advancing age. Resolved. Continue antibiotics Keflex 500mg BID. End date 7/1/17 Prevent further episodes Staghorn calculi noted on CT and urology consulted with no plans for further intervention. This is a chronic finding. Continue antibiotics Keflex 500mg BID as prescribed for 10 more days. End date 7/1/17 Chronic finding. Follow up with urology as an outpatient only if symtpoms worsen. c/w meds as listed above. Consistent carb diet. Follow up with accuchecks as an outpatient and continue with monitoring with your primary care physician. Resolved

## 2017-06-20 NOTE — PROGRESS NOTE ADULT - ASSESSMENT
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 Patient had sepsis secondary to UTI on admission which has now resolved. Altered mental status is most likely toxic encephalopathy secondary to UTI. CVA was possibility given extensive prior hx however, CT head negative and pt symptom improved. Neurology consulted and recommended no further intervention. Encephalopathy resolved and patient is at her baseline.  Urology consulted after CT abdomen done showed staghorn calculi and recommends no surgical intervention at this time but to treat for pyelonephritis. 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 Patient had sepsis secondary to UTI on admission which has now resolved. Altered mental status is most likely toxic encephalopathy secondary to UTI. CVA was possibility given extensive prior hx however, CT head negative and pt symptom improved. Neurology consulted and recommended no further intervention. Carotid show no evidence of significant stenosis and TTE is pending. Encephalopathy resolved and patient is at her baseline.  Urology consulted after CT abdomen done showed staghorn calculi and recommends no surgical intervention at this time, chronic finding but to treat for pyelonephritis.

## 2017-06-20 NOTE — PROGRESS NOTE ADULT - PROBLEM SELECTOR PLAN 7
VTE prophylaxis: Lovenox 40mg SQ qdaily
Discussed with son and confirmed that patient is DNR/DNI  Son is health care proxy  Completed MOLST form in chart.
VTE prophylaxis: Lovenox 40mg SQ qdaily

## 2017-06-20 NOTE — PHYSICAL THERAPY INITIAL EVALUATION ADULT - PLANNED THERAPY INTERVENTIONS, PT EVAL
ROM/strengthening/transfer training/balance training/bed mobility training/neuromuscular re-education/gait training/stretching

## 2017-06-20 NOTE — DISCHARGE NOTE ADULT - ADDITIONAL INSTRUCTIONS
Continue antibiotics Keflex 500mg BID as prescribed for 11 more days. F/u with PMD and urology within a week. Continue antibiotics Keflex 500mg BID as prescribed for 10 more days. Follow up with the physician at the facility and urology in 1-2 weeks.

## 2017-06-20 NOTE — DISCHARGE NOTE ADULT - SECONDARY DIAGNOSIS.
Acute pyelonephritis Staghorn calculus History of CVA (cerebrovascular accident) Type 2 diabetes mellitus without complication, without long-term current use of insulin

## 2017-06-20 NOTE — DISCHARGE NOTE ADULT - NS AS ACTIVITY OBS
Do not drive or operate machinery/Bathing allowed/Do not make important decisions/Showering allowed/No Heavy lifting/straining/with assistance

## 2017-06-20 NOTE — DIETITIAN INITIAL EVALUATION ADULT. - OTHER INFO
Overall, pt is poor historian.  Pt with good po intake at meals per RN flowsheets.  Pt consumes chocolate Glucerna only.  As per RN, pt with good po intake at home and consumes Ensure- reported by son.

## 2017-06-20 NOTE — PHYSICAL THERAPY INITIAL EVALUATION ADULT - PERTINENT HX OF CURRENT PROBLEM, REHAB EVAL
Pt presents to [facility] with worsening weakness and difficulty ambulating

## 2017-06-20 NOTE — PROGRESS NOTE ADULT - PROBLEM SELECTOR PLAN 2
acute encephalopathy. Resolved.  -c/w empiric tx for UTI and d/c home with oral abx acute encephalopathy. Resolved.  -c/w empiric tx for UTI -Initial CT head negative, repeat ct head negative  -carotid duplex does not show significant stenosis   -f/u tte still pending. Echo lab called and echo completed but not read   -c/w  Aggrenox 25/200mg BID  -c/w Lipitor 20mg PO qhs

## 2017-06-20 NOTE — PROGRESS NOTE ADULT - PROBLEM SELECTOR PLAN 1
Pyelonephritis with staghorn calculi  Pt clinically improved. Afebrile and leukocytosis trending down.   -c/w Rocephin 1g IV antibiotics day #4  -Ucx shows <66498 GNR and Bcx neg  -CT abd/pelvis shows a right renal staghorn calculus with mid pole of cortical   scarring deformity of. 3.6 cm right upper pole cyst versus obstructed upper pole collecting system.   -urology consulted and recommends no surgical intervention at this time but to treat for pyelonephritis.  Will plan to dc home on 14 days of oral antibiotics Pyelonephritis with staghorn calculi  Pt clinically improved. Afebrile and leukocytosis trending down.   -c/w Rocephin 1g IV antibiotics day #4  -Ucx shows <91865 GNR and Bcx neg  -CT abd/pelvis shows a right renal staghorn calculus with mid pole of cortical   scarring deformity of. 3.6 cm right upper pole cyst versus obstructed upper pole collecting system.   -urology consulted and recommends no surgical intervention at this time b/c the above findings are chronic but to treat for pyelonephritis.

## 2017-06-20 NOTE — DISCHARGE NOTE ADULT - HOSPITAL COURSE
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 Patient had sepsis secondary to UTI on admission which has now resolved. Altered mental status is most likely toxic encephalopathy secondary to UTI. CVA was possibility given extensive prior hx however, CT head negative and pt symptom improved. Neurology consulted and recommended no further intervention. Encephalopathy resolved and patient is at her baseline. Urology consulted after CT abdomen done showed staghorn calculi and recommends no surgical intervention at this time but to treat for pyelonephritis. Pt is medically optimized for discharge to f/u with PMD and urology within a week. 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 Patient had sepsis secondary to UTI on admission which resolved on admission. Altered mental status was most likely toxic encephalopathy secondary to UTI. CVA was possibility given extensive prior hx however, CT head negative and pts symptoms improved. Carotid duplex showed no evidence of significant stenosis and TTE was wnl. Neurology consulted and recommended no further intervention. Encephalopathy resolved and patient is at her baseline. Urology consulted after CT abdomen done which showed staghorn calculi and recommends no surgical intervention at this time but to treat for pyelonephritis. Pt was started on rocephin with good response remained afebrile and leukocytosis trended to normal. Ucx showed <00146 GNR. Pt is to finish 10 more days of keflex 500mg po bid. Pt is medically optimized for discharge to f/u with PMD and urology as an outpt and to f/u with physician at the facility.     Discharge planning took 45 minutes

## 2017-06-20 NOTE — DISCHARGE NOTE ADULT - OTHER SIGNIFICANT FINDINGS
EXAM:  CT ABDOMEN AND PELVIS                          PROCEDURE DATE:  06/18/2017        INTERPRETATION:  CT renal stone   (CT of the abdomen and pelvis without   contrast)      CLINICAL INFORMATION:      Acute cystitis. Incontinence. Infection.   Assess for pyelonephritis. TECHNIQUE:  Contiguous axial 5 mm sections   were obtained using single helical acquisition through the abdomen and   pelvis and were reconstructed as axial 5 mm sections.  Higher resolution   axial and coronal images were reconstructed through  the kidneys.   Oral   and intravenous contrast were withheld for this indication.      FINDINGS:   No previous examinations are available for review.    The lung bases are clear.         The liver demonstrates homogeneous attenuation with no focal lesion,   allowing for the noncontrast technique.  Hepatic size and contours are   maintained.  Hepatic and portal veins are not displaced.  No intrahepatic   or common ductal dilatation is recognized.  There is cholelithiasis with the multiple lung gallstones without   secondary signs of acute cholecystitis.     The pancreas is intact without ductal dilatation or focal lesion.  The   spleen is normal in size.    No adrenal masses are found.    There is a staghorn no right renal calculus occupying renal calyces and   of the renal pelvis. Right upper pole 3.6 cm dilatation which may   represent an obstructed upper pole of calyx versus renal cyst. The right   ureter is normal in course and caliber of.There is focal right midpole   cortical atrophy likely result of scarring from prior infection. Acute   pyelonephritis cannot be excluded. No gross hydronephrosis seen.     Left kidney and collecting system unremarkable.  .. Liu catheter within collapsed bladder.  Status post hysterectomy.    No enlarged lymph nodes are found.  No ascites is present.  The osseous   structures are intact.    The bowel appears unremarkable.  No obstruction, perforation or abscess   is recognized.         The retroperitoneal vessels show atherosclerotic calcified plaques   throughout  nondilated abdominal aorta, mesenteric and iliac arteries.   IMPRESSION:   A right renal staghorn calculus with mid pole of cortical   scarring deformity of. 3.6 cm right upper pole cyst versus obstructed   upper pole collecting system.  Left kidney unremarkable. Liu catheter in place.  Cholelithiasis.  Acute right renal pyelonephritis cannot be excluded. No perinephric   stranding.                            DEBBY BROWN M.D., ATTENDING RADIOLOGIST  This document has been electronically signed. Jun 18 2017  9:29AM

## 2017-06-21 VITALS
RESPIRATION RATE: 18 BRPM | DIASTOLIC BLOOD PRESSURE: 70 MMHG | OXYGEN SATURATION: 95 % | TEMPERATURE: 98 F | HEART RATE: 77 BPM | SYSTOLIC BLOOD PRESSURE: 138 MMHG

## 2017-06-21 PROCEDURE — 99239 HOSP IP/OBS DSCHRG MGMT >30: CPT

## 2017-06-21 RX ORDER — CEFTRIAXONE 500 MG/1
1 INJECTION, POWDER, FOR SOLUTION INTRAMUSCULAR; INTRAVENOUS EVERY 24 HOURS
Qty: 0 | Refills: 0 | Status: DISCONTINUED | OUTPATIENT
Start: 2017-06-21 | End: 2017-06-21

## 2017-06-21 RX ADMIN — ASPIRIN AND DIPYRIDAMOLE 1 CAPSULE(S): 25; 200 CAPSULE, EXTENDED RELEASE ORAL at 06:26

## 2017-06-21 RX ADMIN — Medication 5 MILLIGRAM(S): at 11:00

## 2017-06-21 RX ADMIN — SERTRALINE 100 MILLIGRAM(S): 25 TABLET, FILM COATED ORAL at 11:00

## 2017-06-21 RX ADMIN — MAGNESIUM OXIDE 400 MG ORAL TABLET 400 MILLIGRAM(S): 241.3 TABLET ORAL at 11:00

## 2017-06-21 RX ADMIN — Medication: at 07:42

## 2017-06-21 RX ADMIN — Medication 1 TABLET(S): at 11:00

## 2017-06-21 RX ADMIN — Medication 500 MILLIGRAM(S): at 11:00

## 2017-06-21 RX ADMIN — Medication 100 MILLIGRAM(S): at 11:00

## 2017-06-21 RX ADMIN — Medication: at 11:17

## 2017-06-21 NOTE — PROGRESS NOTE ADULT - SUBJECTIVE AND OBJECTIVE BOX
Patient is a 82y Female admitted with AMS (17 Jun 2017 21:29)    Patient seen and examined at bedside, No acute overnight events. Pt appears comfortable and in no obvious form of distress. As per nurse, she was able to communicate her needs and perky with son at bedside yesterday. Patient  is eating well and voiding. Last BM was yesterday and stool was formed as per nurse aid.    Pt denies fever, chest pain, SOB, abdominal pain, diarrhea, constipation, calf pain.    VS:   Vital Signs Last 24 Hrs  T(C): 37.1, Max: 37.2 (06-19 @ 16:33)  T(F): 98.8, Max: 98.9 (06-19 @ 16:33)  HR: 80 (73 - 80)  BP: 130/64 (113/61 - 130/64)  RR: 18 (18 - 18)  SpO2: 98% (98% - 98%)    Physical Exam:     Physical Exam:   Gen: NAD  HEENT: NCAT, PERRLA, anisocoria -mild and both pupils are reactive.  CVS: RRR, +s1/s2, no murmurs appreciated.   Lungs: Good air entry bilaterally, CTAB, no wheeze/rales/rhonchi  Abd: +BS, soft, NT, ND  Ext: No cyanosis, edema or calf tenderness, +DP/PT pulses bilaterally. left knee lesion with no exudate.   Neuro: Awake, Alert and confused. CNII-XII grossly intact, motor strength 5/5 in upper right extremity and 3/5 in left upper extremity and 5/5 in lower right extremity and 2/5 in lower left extremity, sensation intact in bilateral upper and lower extremities. Unable to assess Gait.  Skin: No ulcers, rashes or skin breaks.    Labs:                     No new lab  I&O's Summary    I & Os for current day (as of 21 Jun 2017 07:08)  =============================================  IN: 1750 ml / OUT: 0 ml / NET: 1750 ml      Radiology:    EXAM:  ECHO TRANSTHORACIC COMP W DOPP      PROCEDURE DATE:  Jun 20 2017     INTERPRETATION:  REPORT:    TRANSTHORACIC ECHOCARDIOGRAM REPORT      Date of Exam: 6/20/2017 12:30:59 PM  Sonographer:  Alisa Qiu      PHYSICIAN INTERPRETATION:  Left Ventricle: The left ventricular internal cavity size is normal.  Global LV systolic function was normal. Left ventricular ejection   fraction, by visual estimation, is 60 to 65%. Spectral Doppler shows   impaired relaxation pattern of left ventricular myocardial filling (Grade   I diastolic dysfunction).  Right Ventricle: Normal right ventricular size and function.  Left Atrium: The left atrium is normal in size.  Right Atrium: The right atrium is normal in size.  Pericardium: There is no evidence of pericardial effusion.  Mitral Valve: Thickening and calcification of the anterior and posterior   mitral valve leaflets. Mild mitral valve regurgitation is seen.  Tricuspid Valve: The tricuspid valve is normal in structure. Mild   tricuspid regurgitation is visualized.  Aortic Valve: The aortic valve is trileaflet. Sclerotic aortic valve with   normal opening. No evidence of aortic valve regurgitation is seen.  Pulmonic Valve: Structurally normal pulmonic valve, with normal leaflet   excursion. Trace pulmonic valve regurgitation.  Aorta: The aortic root and ascending aorta are structurally normal, with   no evidence of dilitation.  Pulmonary Artery: The main pulmonary artery is normal in size.  Venous: The inferior vena cava was normal sized, with respiratory size   variation greater than 50%.        Summary:   1. Left ventricular ejection fraction, by visual estimation, is 60 to   65%.   2. Normal global left ventricular systolic function.   3. Spectral Doppler shows impaired relaxation pattern of left   ventricular myocardial filling (Grade I diastolic dysfunction).     MD Kayla Electronically signed on 6/20/2017 at 5:11:55 PM             Medications:  MEDICATIONS  (STANDING):  enoxaparin Injectable 40milliGRAM(s) SubCutaneous every 24 hours  cefTRIAXone   IVPB 1Gram(s) IV Intermittent every 24 hours  atorvastatin 20milliGRAM(s) Oral at bedtime  dipyridamole 200 mG/aspirin 25 mG 1Capsule(s) Oral two times a day  insulin lispro (HumaLOG) corrective regimen sliding scale  SubCutaneous three times a day before meals  insulin lispro (HumaLOG) corrective regimen sliding scale  SubCutaneous at bedtime  dextrose 5%. 1000milliLiter(s) IV Continuous <Continuous>  dextrose 50% Injectable 12.5Gram(s) IV Push once  dextrose 50% Injectable 25Gram(s) IV Push once  dextrose 50% Injectable 25Gram(s) IV Push once  oxybutynin XL 5milliGRAM(s) Oral daily  multivitamin 1Tablet(s) Oral daily  sertraline 100milliGRAM(s) Oral daily  insulin glargine Injectable (LANTUS) 5Unit(s) SubCutaneous at bedtime  ascorbic acid 500milliGRAM(s) Oral daily  thiamine 100milliGRAM(s) Oral daily  magnesium oxide 400milliGRAM(s) Oral daily    MEDICATIONS  (PRN):  dextrose Gel 1Dose(s) Oral once PRN Blood Glucose LESS THAN 70 milliGRAM(s)/deciliter  glucagon  Injectable 1milliGRAM(s) IntraMuscular once PRN Glucose LESS THAN 70 milligrams/deciliter  acetaminophen   Tablet 650milliGRAM(s) Oral every 6 hours PRN For Temp greater than 38 C (100.4 F)

## 2017-06-21 NOTE — PROGRESS NOTE ADULT - PROBLEM SELECTOR PLAN 3
-CT head and US carotid negative  TTE done and shows EF of 60-65% with diastolic dysfunction  -c/w  Aggrenox 25/200mg BID  -c/w Lipitor 20mg PO qhs -CT head and US carotid negative  TTE done and shows EF of 60-65% with diastolic dysfunction - no need for further intervention   -c/w  Aggrenox 25/200mg BID  -c/w Lipitor 20mg PO qhs

## 2017-06-21 NOTE — PROGRESS NOTE ADULT - ASSESSMENT
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 Patient had sepsis secondary to UTI on admission which has now resolved. Altered mental status is most likely toxic encephalopathy secondary to UTI. CVA was possibility given extensive prior hx however, CT head negative and pt symptom improved. Neurology consulted and recommended no further intervention. Carotid show no evidence of significant stenosis and TTE is pending. Encephalopathy resolved and patient is at her baseline.  Urology consulted after CT abdomen done showed staghorn calculi and recommends no surgical intervention at this time, chronic finding but to treat for pyelonephritis. Pt now medically stable for discharge to complete 10 more days of po antibiotics.

## 2017-06-21 NOTE — PROGRESS NOTE ADULT - PROBLEM SELECTOR PLAN 1
Pyelonephritis with staghorn calculi  Pt clinically improved. Afebrile   -c/w Rocephin 1g IV antibiotics day #5  Stable for discharge to rehab as per PT recommendation. Pyelonephritis with staghorn calculi   Pt clinically improved. Afebrile   -leukocytosis has normalized   -c/w Rocephin 1g IV antibiotics day #5  -will continue with keflex 500mg po bid for 10 more days   Stable for discharge to rehab as per PT recommendation.

## 2017-06-21 NOTE — PROGRESS NOTE ADULT - PROBLEM SELECTOR PLAN 2
Glucose starting to trend up. Rocephin changed from D5 to NaCl.  -Continue current regimen.   -c/w Accuchecks   -c/w Diabetic consistent carb diet  -c/w Humalog sliding scale Glucose starting to trend up. Patient is eating chocolate and fs was taken thereafter. Rocephin changed from D5 to NaCl.  -Continue current regimen.   -c/w Accuchecks   -c/w Diabetic consistent carb diet  -c/w Humalog sliding scale  -pt counsled on diet compliance

## 2017-06-21 NOTE — PROGRESS NOTE ADULT - ATTENDING COMMENTS
Note addended where needed. As per PT patient could benefit from LIZ and son in agreement. D/w CM and pending authorization at this time. If medically stable and echo wnl then d/c planning for tomorrow if authorization obtained.
Note addended where needed.
Note addended where needed. Patient medically stable to be discharged to [facility]. D/w CM and SW in regards to the plan of care. Pt to f/u with physician at the facility and finish 10 more days of abx.
Note addended where needed. Patient's son updated in regards to the plan of care. PT ordered.

## 2017-06-21 NOTE — PROGRESS NOTE ADULT - PROBLEM SELECTOR PROBLEM 1
UTI (urinary tract infection)
Altered mental status
UTI (urinary tract infection)
UTI (urinary tract infection)

## 2017-06-22 LAB
CULTURE RESULTS: SIGNIFICANT CHANGE UP
SPECIMEN SOURCE: SIGNIFICANT CHANGE UP

## 2017-06-28 PROBLEM — Z00.00 ENCOUNTER FOR PREVENTIVE HEALTH EXAMINATION: Status: ACTIVE | Noted: 2017-06-28

## 2017-07-24 ENCOUNTER — APPOINTMENT (OUTPATIENT)
Dept: UROLOGY | Facility: CLINIC | Age: 82
End: 2017-07-24

## 2018-02-01 ENCOUNTER — OUTPATIENT (OUTPATIENT)
Dept: OUTPATIENT SERVICES | Facility: HOSPITAL | Age: 83
LOS: 1 days | End: 2018-02-01
Payer: MEDICAID

## 2018-02-01 DIAGNOSIS — Z90.710 ACQUIRED ABSENCE OF BOTH CERVIX AND UTERUS: Chronic | ICD-10-CM

## 2018-02-01 PROCEDURE — G9001: CPT

## 2018-02-13 ENCOUNTER — EMERGENCY (EMERGENCY)
Facility: HOSPITAL | Age: 83
LOS: 1 days | Discharge: HOME | End: 2018-02-13
Attending: EMERGENCY MEDICINE | Admitting: EMERGENCY MEDICINE
Payer: MEDICARE

## 2018-02-13 VITALS
TEMPERATURE: 98 F | HEART RATE: 78 BPM | DIASTOLIC BLOOD PRESSURE: 72 MMHG | RESPIRATION RATE: 18 BRPM | SYSTOLIC BLOOD PRESSURE: 142 MMHG | OXYGEN SATURATION: 97 %

## 2018-02-13 VITALS — HEIGHT: 60 IN | WEIGHT: 100.09 LBS

## 2018-02-13 DIAGNOSIS — Z90.710 ACQUIRED ABSENCE OF BOTH CERVIX AND UTERUS: Chronic | ICD-10-CM

## 2018-02-13 LAB
ALBUMIN SERPL ELPH-MCNC: 4 G/DL — SIGNIFICANT CHANGE UP (ref 3.3–5.2)
ALP SERPL-CCNC: 117 U/L — SIGNIFICANT CHANGE UP (ref 40–120)
ALT FLD-CCNC: 10 U/L — SIGNIFICANT CHANGE UP
ANION GAP SERPL CALC-SCNC: 16 MMOL/L — SIGNIFICANT CHANGE UP (ref 5–17)
APTT BLD: 32.6 SEC — SIGNIFICANT CHANGE UP (ref 27.5–37.4)
AST SERPL-CCNC: 14 U/L — SIGNIFICANT CHANGE UP
BASOPHILS # BLD AUTO: 0 K/UL — SIGNIFICANT CHANGE UP (ref 0–0.2)
BASOPHILS NFR BLD AUTO: 0.5 % — SIGNIFICANT CHANGE UP (ref 0–2)
BILIRUB SERPL-MCNC: 0.3 MG/DL — LOW (ref 0.4–2)
BLD GP AB SCN SERPL QL: SIGNIFICANT CHANGE UP
BUN SERPL-MCNC: 27 MG/DL — HIGH (ref 8–20)
CALCIUM SERPL-MCNC: 9.5 MG/DL — SIGNIFICANT CHANGE UP (ref 8.6–10.2)
CHLORIDE SERPL-SCNC: 105 MMOL/L — SIGNIFICANT CHANGE UP (ref 98–107)
CO2 SERPL-SCNC: 23 MMOL/L — SIGNIFICANT CHANGE UP (ref 22–29)
CREAT SERPL-MCNC: 1.14 MG/DL — SIGNIFICANT CHANGE UP (ref 0.5–1.3)
EOSINOPHIL # BLD AUTO: 0 K/UL — SIGNIFICANT CHANGE UP (ref 0–0.5)
EOSINOPHIL NFR BLD AUTO: 0.3 % — SIGNIFICANT CHANGE UP (ref 0–6)
GLUCOSE SERPL-MCNC: 177 MG/DL — HIGH (ref 70–115)
HCT VFR BLD CALC: 37.1 % — SIGNIFICANT CHANGE UP (ref 37–47)
HGB BLD-MCNC: 11.8 G/DL — LOW (ref 12–16)
INR BLD: 1.08 RATIO — SIGNIFICANT CHANGE UP (ref 0.88–1.16)
LYMPHOCYTES # BLD AUTO: 2.3 K/UL — SIGNIFICANT CHANGE UP (ref 1–4.8)
LYMPHOCYTES # BLD AUTO: 23.6 % — SIGNIFICANT CHANGE UP (ref 20–55)
MCHC RBC-ENTMCNC: 27.1 PG — SIGNIFICANT CHANGE UP (ref 27–31)
MCHC RBC-ENTMCNC: 31.8 G/DL — LOW (ref 32–36)
MCV RBC AUTO: 85.1 FL — SIGNIFICANT CHANGE UP (ref 81–99)
MONOCYTES # BLD AUTO: 0.5 K/UL — SIGNIFICANT CHANGE UP (ref 0–0.8)
MONOCYTES NFR BLD AUTO: 5.4 % — SIGNIFICANT CHANGE UP (ref 3–10)
NEUTROPHILS # BLD AUTO: 6.7 K/UL — SIGNIFICANT CHANGE UP (ref 1.8–8)
NEUTROPHILS NFR BLD AUTO: 70.1 % — SIGNIFICANT CHANGE UP (ref 37–73)
PLATELET # BLD AUTO: 312 K/UL — SIGNIFICANT CHANGE UP (ref 150–400)
POTASSIUM SERPL-MCNC: 4.3 MMOL/L — SIGNIFICANT CHANGE UP (ref 3.5–5.3)
POTASSIUM SERPL-SCNC: 4.3 MMOL/L — SIGNIFICANT CHANGE UP (ref 3.5–5.3)
PROT SERPL-MCNC: 7.9 G/DL — SIGNIFICANT CHANGE UP (ref 6.6–8.7)
PROTHROM AB SERPL-ACNC: 11.9 SEC — SIGNIFICANT CHANGE UP (ref 9.8–12.7)
RBC # BLD: 4.36 M/UL — LOW (ref 4.4–5.2)
RBC # FLD: 14.8 % — SIGNIFICANT CHANGE UP (ref 11–15.6)
SODIUM SERPL-SCNC: 144 MMOL/L — SIGNIFICANT CHANGE UP (ref 135–145)
TYPE + AB SCN PNL BLD: SIGNIFICANT CHANGE UP
WBC # BLD: 9.6 K/UL — SIGNIFICANT CHANGE UP (ref 4.8–10.8)
WBC # FLD AUTO: 9.6 K/UL — SIGNIFICANT CHANGE UP (ref 4.8–10.8)

## 2018-02-13 PROCEDURE — 85730 THROMBOPLASTIN TIME PARTIAL: CPT

## 2018-02-13 PROCEDURE — 80053 COMPREHEN METABOLIC PANEL: CPT

## 2018-02-13 PROCEDURE — 36415 COLL VENOUS BLD VENIPUNCTURE: CPT

## 2018-02-13 PROCEDURE — 86900 BLOOD TYPING SEROLOGIC ABO: CPT

## 2018-02-13 PROCEDURE — 70450 CT HEAD/BRAIN W/O DYE: CPT | Mod: 26

## 2018-02-13 PROCEDURE — 71045 X-RAY EXAM CHEST 1 VIEW: CPT

## 2018-02-13 PROCEDURE — 71045 X-RAY EXAM CHEST 1 VIEW: CPT | Mod: 26

## 2018-02-13 PROCEDURE — 86850 RBC ANTIBODY SCREEN: CPT

## 2018-02-13 PROCEDURE — 12013 RPR F/E/E/N/L/M 2.6-5.0 CM: CPT

## 2018-02-13 PROCEDURE — 85610 PROTHROMBIN TIME: CPT

## 2018-02-13 PROCEDURE — 85027 COMPLETE CBC AUTOMATED: CPT

## 2018-02-13 PROCEDURE — 86901 BLOOD TYPING SEROLOGIC RH(D): CPT

## 2018-02-13 PROCEDURE — 99284 EMERGENCY DEPT VISIT MOD MDM: CPT | Mod: 25

## 2018-02-13 PROCEDURE — 90471 IMMUNIZATION ADMIN: CPT

## 2018-02-13 PROCEDURE — 72125 CT NECK SPINE W/O DYE: CPT

## 2018-02-13 PROCEDURE — 90715 TDAP VACCINE 7 YRS/> IM: CPT

## 2018-02-13 PROCEDURE — 72125 CT NECK SPINE W/O DYE: CPT | Mod: 26

## 2018-02-13 PROCEDURE — 70450 CT HEAD/BRAIN W/O DYE: CPT

## 2018-02-13 PROCEDURE — G0168: CPT

## 2018-02-13 RX ORDER — ATORVASTATIN CALCIUM 80 MG/1
0 TABLET, FILM COATED ORAL
Qty: 0 | Refills: 0 | COMMUNITY

## 2018-02-13 RX ORDER — ASPIRIN/CALCIUM CARB/MAGNESIUM 324 MG
0 TABLET ORAL
Qty: 0 | Refills: 0 | COMMUNITY

## 2018-02-13 RX ORDER — SERTRALINE 25 MG/1
0 TABLET, FILM COATED ORAL
Qty: 0 | Refills: 0 | COMMUNITY

## 2018-02-13 RX ORDER — OXYBUTYNIN CHLORIDE 5 MG
0 TABLET ORAL
Qty: 0 | Refills: 0 | COMMUNITY

## 2018-02-13 RX ORDER — TETANUS TOXOID, REDUCED DIPHTHERIA TOXOID AND ACELLULAR PERTUSSIS VACCINE, ADSORBED 5; 2.5; 8; 8; 2.5 [IU]/.5ML; [IU]/.5ML; UG/.5ML; UG/.5ML; UG/.5ML
0.5 SUSPENSION INTRAMUSCULAR ONCE
Qty: 0 | Refills: 0 | Status: COMPLETED | OUTPATIENT
Start: 2018-02-13 | End: 2018-02-13

## 2018-02-13 RX ORDER — SITAGLIPTIN 50 MG/1
0 TABLET, FILM COATED ORAL
Qty: 0 | Refills: 0 | COMMUNITY

## 2018-02-13 RX ADMIN — TETANUS TOXOID, REDUCED DIPHTHERIA TOXOID AND ACELLULAR PERTUSSIS VACCINE, ADSORBED 0.5 MILLILITER(S): 5; 2.5; 8; 8; 2.5 SUSPENSION INTRAMUSCULAR at 10:36

## 2018-02-13 NOTE — ED PROVIDER NOTE - MEDICAL DECISION MAKING DETAILS
Pt presents w/ R frontal hematoma s/p fall.  Pt at neurologic baseline w/ o acute deficits;  plan CTh, labs, laceration repair, reevaluation

## 2018-02-13 NOTE — ED PROVIDER NOTE - PHYSICAL EXAMINATION
PT has large R frontal hematoma with overlying oblique abrasion  appx 5 cm long;  no active bleeding  TM normal and clear on L ; cerumen impacted on R  NO midline spinal tenderness  NO chest wall, abdominal, hip, or LE tenderness.   PT has ROM b/l hips  PT noted to have slight pronator drift on the LUE ( son states baseline) but has no facial asymmetry and no drift of the LE

## 2018-02-13 NOTE — ED ADULT NURSE NOTE - OBJECTIVE STATEMENT
Patient awake and alert to person and place only. s/p fall, tripped while walking at home this morning at 7am. Hx dementia, baseline mental status. As per son pt got hit on kitchen table.

## 2018-02-13 NOTE — ED ADULT TRIAGE NOTE - HEIGHT IN INCHES
Pt was seen at [facility] ER on 10/14/17 for Sprain of unsp part of left wrist and hand init encntr.  
0

## 2018-02-13 NOTE — ED PROVIDER NOTE - OBJECTIVE STATEMENT
Pt is an 82yoF with h/o CVA w/ residual L sided weakness , some imbalance, and dementia, presenting with head trauma s/p fall. Per son, who lives w/ patient patient got up on her own prior to A arrival and he heard a thud around 730 This morning.  HE states patient was awake, alert when he arrived, at baseline mental status. Initially endorses some dizziness and nausea, but he was able to help her up and states she walked around normally with her walker after that. Pt fell in the kitchen where there is a table and linoleum floors- he feels patient may have hit her head on the table, but the fall was unwitnessed.  At present, pt denies any pain, nausea, dizziness, lightheadedness.  Per son patient has a history of chronic recurrent UTIs and is currently on macrobid - is normally followed through [facility] - he does not feel her weakness is any worse than baseline and does not feel she has a recurrent UTI at this time as she normally develops very purulent malodorous urine and has not noticed that recently.    PMD:  Dr. Heck.

## 2018-02-13 NOTE — ED ADULT NURSE NOTE - CHIEF COMPLAINT QUOTE
tripped while walking at home, forehead laceration. No LOC. Happened 7am.  pt is on Aggrenox for CVA. Baseline mental status. Mild dementia. hit on kitchen table. Checked with Dr Sierra to triage area, no code trauma since Aggrenox is antiplatelet per out protocol. will send to main room.

## 2018-02-13 NOTE — ED PROCEDURE NOTE - PROCEDURE ADDITIONAL DETAILS
Wound well proximated laterally; likely superficial skin tear medially not allowing complete approximation.  No bleeding.  Son given instructions on dermabond care.

## 2018-02-13 NOTE — ED PROVIDER NOTE - PROGRESS NOTE DETAILS
Pt has remained asymptomatic throughout ED Stay.  Superficial forehead laceration repaired w/ dermabond.  I reviewed patient's results with patient and allowed the opportunity for questions.  I provided the patient with anticipatory guidance, advised followup with PCP, and gave return precautions. Patient reported that they were feeling well for discharge and expressed understanding of instructions.  Patient is well for discharge.

## 2018-02-13 NOTE — ED ADULT TRIAGE NOTE - CHIEF COMPLAINT QUOTE
tripped while walking at home, forehead laceration. No LOC. Happened 7am.  pt is on Aggrenox for CVA. Baseline mental status. Mild dementia. hit on kitchen table. Checked with Dr Sierra to triage area, no code trauma since Aggrenox is antiplatelet per out protocola. will send to main room tripped while walking at home, forehead laceration. No LOC. Happened 7am.  pt is on Aggrenox for CVA. Baseline mental status. Mild dementia. hit on kitchen table. Checked with Dr Sierra to triage area, no code trauma since Aggrenox is antiplatelet per out protocol. will send to main room.

## 2018-02-15 DIAGNOSIS — R69 ILLNESS, UNSPECIFIED: ICD-10-CM

## 2018-05-03 RX ORDER — CEPHALEXIN 500 MG
1 CAPSULE ORAL
Qty: 0 | Refills: 0 | COMMUNITY

## 2018-05-03 RX ORDER — OXYBUTYNIN CHLORIDE 5 MG
1 TABLET ORAL
Qty: 0 | Refills: 0 | COMMUNITY

## 2018-05-03 RX ORDER — METFORMIN HYDROCHLORIDE 850 MG/1
1 TABLET ORAL
Qty: 0 | Refills: 0 | COMMUNITY

## 2018-05-03 RX ORDER — ASPIRIN AND DIPYRIDAMOLE 25; 200 MG/1; MG/1
1 CAPSULE, EXTENDED RELEASE ORAL
Qty: 0 | Refills: 0 | COMMUNITY

## 2018-05-03 RX ORDER — ATORVASTATIN CALCIUM 80 MG/1
1 TABLET, FILM COATED ORAL
Qty: 0 | Refills: 0 | COMMUNITY

## 2018-05-03 RX ORDER — ASPIRIN/CALCIUM CARB/MAGNESIUM 324 MG
0 TABLET ORAL
Qty: 0 | Refills: 0 | COMMUNITY

## 2018-05-03 RX ORDER — SERTRALINE 25 MG/1
1 TABLET, FILM COATED ORAL
Qty: 0 | Refills: 0 | COMMUNITY

## 2018-05-03 RX ORDER — SITAGLIPTIN 50 MG/1
1 TABLET, FILM COATED ORAL
Qty: 0 | Refills: 0 | COMMUNITY

## 2018-05-04 PROBLEM — I63.9 CEREBRAL INFARCTION, UNSPECIFIED: Chronic | Status: ACTIVE | Noted: 2017-06-17

## 2018-08-20 ENCOUNTER — TRANSCRIPTION ENCOUNTER (OUTPATIENT)
Age: 83
End: 2018-08-20

## 2019-08-02 NOTE — ED ADULT TRIAGE NOTE - WEIGHT IN KG
Alert-The patient is alert, awake and responds to voice. The patient is oriented to time, place, and person. The triage nurse is able to obtain subjective information.
63.5

## 2021-02-15 NOTE — ED ADULT NURSE NOTE - CAS DISCH ACCOMP BY
Dr. Vick orders for CT simulation 2/18 or 2/19.  RT informed.  Patient CT simulation schedule for Friday, 2/19 @ 1:45pm  Patient to be seen by Dr. Vick @ 1:15 pm prior to CT simulation.  No bowel or bladder fill requirements per Dr. Vick, RT informed. COVID testing schedule for [place], 2/17 @ 1:10 pm, arrival 1pm.  Patient daughterSujata was notified by phone of CT simulation schedule and COVID testing times and number to call upon arrival for COVID testing and Covid  Guidelines.  Sujata verbalized understanding and reports she is presently with her mother and will give her the instructions.  Informed patient does have some discomfort to her back when lying down.  Suggest daughter if patient able to take tylenol than suggest to take tylenol ES 1 tab 30 minutes before schedule CT simulation.  Daughter verbalized understanding. Daughter states, \"she was seen by Dr. Denson today and my mother started with the vaginal bleeding again a few days ago so she is in agreement now for the radiation treatments\".    Family/son

## 2022-03-28 NOTE — ED PROVIDER NOTE - CROS ED ROS STATEMENT
Patient advised to call if any problems, questions, or concerns. all other ROS negative except as per HPI

## 2025-01-14 NOTE — ED ADULT NURSE NOTE - GENITOURINARY WDL
Patient tolerated Opdivo well today. PIV removed, catheter tip intact. Plan to rtc 2/11th for next infusion. NAD noted upon discharge. Discharged home, ambulated independently.   
Bladder non-tender and non-distended. Urine clear yellow.